# Patient Record
Sex: FEMALE | Race: ASIAN | NOT HISPANIC OR LATINO | Employment: UNEMPLOYED | URBAN - METROPOLITAN AREA
[De-identification: names, ages, dates, MRNs, and addresses within clinical notes are randomized per-mention and may not be internally consistent; named-entity substitution may affect disease eponyms.]

---

## 2019-05-01 ENCOUNTER — LAB VISIT (OUTPATIENT)
Dept: LAB | Facility: HOSPITAL | Age: 29
End: 2019-05-01
Attending: FAMILY MEDICINE
Payer: COMMERCIAL

## 2019-05-01 ENCOUNTER — OFFICE VISIT (OUTPATIENT)
Dept: FAMILY MEDICINE | Facility: CLINIC | Age: 29
End: 2019-05-01
Attending: FAMILY MEDICINE
Payer: COMMERCIAL

## 2019-05-01 VITALS
OXYGEN SATURATION: 100 % | HEART RATE: 102 BPM | BODY MASS INDEX: 20.11 KG/M2 | TEMPERATURE: 98 F | DIASTOLIC BLOOD PRESSURE: 72 MMHG | SYSTOLIC BLOOD PRESSURE: 108 MMHG | WEIGHT: 99.75 LBS | HEIGHT: 59 IN

## 2019-05-01 DIAGNOSIS — Z71.85 IMMUNIZATION COUNSELING: ICD-10-CM

## 2019-05-01 DIAGNOSIS — Z71.85 IMMUNIZATION COUNSELING: Primary | ICD-10-CM

## 2019-05-01 PROCEDURE — 36415 COLL VENOUS BLD VENIPUNCTURE: CPT | Mod: PO

## 2019-05-01 PROCEDURE — 99385 PREV VISIT NEW AGE 18-39: CPT | Mod: 25,S$GLB,, | Performed by: FAMILY MEDICINE

## 2019-05-01 PROCEDURE — 99999 PR PBB SHADOW E&M-NEW PATIENT-LVL III: CPT | Mod: PBBFAC,,, | Performed by: FAMILY MEDICINE

## 2019-05-01 PROCEDURE — 86706 HEP B SURFACE ANTIBODY: CPT

## 2019-05-01 PROCEDURE — 99999 PR PBB SHADOW E&M-NEW PATIENT-LVL III: ICD-10-PCS | Mod: PBBFAC,,, | Performed by: FAMILY MEDICINE

## 2019-05-01 PROCEDURE — 86765 RUBEOLA ANTIBODY: CPT

## 2019-05-01 PROCEDURE — 86762 RUBELLA ANTIBODY: CPT

## 2019-05-01 PROCEDURE — 99385 PR PREVENTIVE VISIT,NEW,18-39: ICD-10-PCS | Mod: 25,S$GLB,, | Performed by: FAMILY MEDICINE

## 2019-05-01 PROCEDURE — 86735 MUMPS ANTIBODY: CPT

## 2019-05-01 RX ORDER — NORETHINDRONE ACETATE AND ETHINYL ESTRADIOL AND FERROUS FUMARATE 1MG-20(21)
1 KIT ORAL DAILY
Refills: 3 | COMMUNITY
Start: 2019-04-01
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
## 2019-05-02 LAB
HBV SURFACE AB SER-ACNC: POSITIVE M[IU]/ML
RUBV IGG SER-ACNC: 50.3 IU/ML
RUBV IGG SER-IMP: REACTIVE

## 2019-05-03 LAB
MUMPS IGG INTERPRETATION: POSITIVE
MUMPS IGG SCREEN: 1.76 ISR (ref 0–0.9)
RUBEOLA IGG ANTIBODY: 1.94 ISR (ref 0–0.9)
RUBEOLA INTERPRETATION: POSITIVE

## 2019-05-08 ENCOUNTER — TELEPHONE (OUTPATIENT)
Dept: FAMILY MEDICINE | Facility: CLINIC | Age: 29
End: 2019-05-08

## 2019-05-08 NOTE — TELEPHONE ENCOUNTER
Spoke with pt, informed her that the form and labs are ready at the front for . Pt verbalized understanding.

## 2019-10-24 ENCOUNTER — TELEPHONE (OUTPATIENT)
Dept: FAMILY MEDICINE | Facility: CLINIC | Age: 29
End: 2019-10-24

## 2019-10-24 NOTE — TELEPHONE ENCOUNTER
Spoke with pt, she states that she needs a flu shot and TB skin test for school. Scheduled appointment for 11/5/19 at 4 pm.

## 2019-10-24 NOTE — TELEPHONE ENCOUNTER
----- Message from Josee Booth sent at 10/24/2019 12:36 PM CDT -----  Contact: Patient  Patient would like a flu shot and TB shot. Patient would like shot before 11/11/19.    [phone]

## 2019-11-05 ENCOUNTER — OFFICE VISIT (OUTPATIENT)
Dept: FAMILY MEDICINE | Facility: CLINIC | Age: 29
End: 2019-11-05
Attending: FAMILY MEDICINE
Payer: COMMERCIAL

## 2019-11-05 VITALS
HEIGHT: 59 IN | SYSTOLIC BLOOD PRESSURE: 112 MMHG | DIASTOLIC BLOOD PRESSURE: 74 MMHG | TEMPERATURE: 100 F | WEIGHT: 100.5 LBS | OXYGEN SATURATION: 99 % | BODY MASS INDEX: 20.26 KG/M2 | HEART RATE: 122 BPM

## 2019-11-05 DIAGNOSIS — Z00.00 WELLNESS EXAMINATION: Primary | ICD-10-CM

## 2019-11-05 PROCEDURE — 99999 PR PBB SHADOW E&M-EST. PATIENT-LVL III: CPT | Mod: PBBFAC,,, | Performed by: FAMILY MEDICINE

## 2019-11-05 PROCEDURE — 86580 TB INTRADERMAL TEST: CPT | Mod: S$GLB,,, | Performed by: FAMILY MEDICINE

## 2019-11-05 PROCEDURE — 90471 IMMUNIZATION ADMIN: CPT | Mod: S$GLB,,, | Performed by: FAMILY MEDICINE

## 2019-11-05 PROCEDURE — 90686 IIV4 VACC NO PRSV 0.5 ML IM: CPT | Mod: S$GLB,,, | Performed by: FAMILY MEDICINE

## 2019-11-05 PROCEDURE — 86580 PR  TB INTRADERMAL TEST: ICD-10-PCS | Mod: S$GLB,,, | Performed by: FAMILY MEDICINE

## 2019-11-05 PROCEDURE — 99395 PREV VISIT EST AGE 18-39: CPT | Mod: 25,S$GLB,, | Performed by: FAMILY MEDICINE

## 2019-11-05 PROCEDURE — 99395 PR PREVENTIVE VISIT,EST,18-39: ICD-10-PCS | Mod: 25,S$GLB,, | Performed by: FAMILY MEDICINE

## 2019-11-05 PROCEDURE — 90471 FLU VACCINE (QUAD) GREATER THAN OR EQUAL TO 3YO PRESERVATIVE FREE IM: ICD-10-PCS | Mod: S$GLB,,, | Performed by: FAMILY MEDICINE

## 2019-11-05 PROCEDURE — 90686 FLU VACCINE (QUAD) GREATER THAN OR EQUAL TO 3YO PRESERVATIVE FREE IM: ICD-10-PCS | Mod: S$GLB,,, | Performed by: FAMILY MEDICINE

## 2019-11-05 PROCEDURE — 99999 PR PBB SHADOW E&M-EST. PATIENT-LVL III: ICD-10-PCS | Mod: PBBFAC,,, | Performed by: FAMILY MEDICINE
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
## 2019-11-07 ENCOUNTER — CLINICAL SUPPORT (OUTPATIENT)
Dept: FAMILY MEDICINE | Facility: CLINIC | Age: 29
End: 2019-11-07
Payer: COMMERCIAL

## 2021-11-09 ENCOUNTER — PATIENT OUTREACH (OUTPATIENT)
Dept: ADMINISTRATIVE | Facility: HOSPITAL | Age: 31
End: 2021-11-09
Payer: COMMERCIAL

## 2022-05-23 ENCOUNTER — PATIENT OUTREACH (OUTPATIENT)
Dept: ADMINISTRATIVE | Facility: OTHER | Age: 32
End: 2022-05-23
Payer: COMMERCIAL

## 2022-06-01 ENCOUNTER — OFFICE VISIT (OUTPATIENT)
Dept: FAMILY MEDICINE | Facility: CLINIC | Age: 32
End: 2022-06-01
Attending: FAMILY MEDICINE
Payer: COMMERCIAL

## 2022-06-01 VITALS
TEMPERATURE: 98 F | BODY MASS INDEX: 22.18 KG/M2 | WEIGHT: 110 LBS | HEIGHT: 59 IN | HEART RATE: 104 BPM | OXYGEN SATURATION: 99 % | SYSTOLIC BLOOD PRESSURE: 120 MMHG | DIASTOLIC BLOOD PRESSURE: 80 MMHG

## 2022-06-01 DIAGNOSIS — Z00.00 ANNUAL PHYSICAL EXAM: Primary | ICD-10-CM

## 2022-06-01 PROCEDURE — 99395 PR PREVENTIVE VISIT,EST,18-39: ICD-10-PCS | Mod: 25,S$GLB,, | Performed by: NURSE PRACTITIONER

## 2022-06-01 PROCEDURE — 90715 TDAP VACCINE GREATER THAN OR EQUAL TO 7YO IM: ICD-10-PCS | Mod: S$GLB,,, | Performed by: NURSE PRACTITIONER

## 2022-06-01 PROCEDURE — 1159F MED LIST DOCD IN RCRD: CPT | Mod: CPTII,S$GLB,, | Performed by: NURSE PRACTITIONER

## 2022-06-01 PROCEDURE — 99395 PREV VISIT EST AGE 18-39: CPT | Mod: 25,S$GLB,, | Performed by: NURSE PRACTITIONER

## 2022-06-01 PROCEDURE — 3079F DIAST BP 80-89 MM HG: CPT | Mod: CPTII,S$GLB,, | Performed by: NURSE PRACTITIONER

## 2022-06-01 PROCEDURE — 3074F SYST BP LT 130 MM HG: CPT | Mod: CPTII,S$GLB,, | Performed by: NURSE PRACTITIONER

## 2022-06-01 PROCEDURE — 99999 PR PBB SHADOW E&M-EST. PATIENT-LVL III: ICD-10-PCS | Mod: PBBFAC,,, | Performed by: NURSE PRACTITIONER

## 2022-06-01 PROCEDURE — 90715 TDAP VACCINE 7 YRS/> IM: CPT | Mod: S$GLB,,, | Performed by: NURSE PRACTITIONER

## 2022-06-01 PROCEDURE — 3074F PR MOST RECENT SYSTOLIC BLOOD PRESSURE < 130 MM HG: ICD-10-PCS | Mod: CPTII,S$GLB,, | Performed by: NURSE PRACTITIONER

## 2022-06-01 PROCEDURE — 1159F PR MEDICATION LIST DOCUMENTED IN MEDICAL RECORD: ICD-10-PCS | Mod: CPTII,S$GLB,, | Performed by: NURSE PRACTITIONER

## 2022-06-01 PROCEDURE — 90471 IMMUNIZATION ADMIN: CPT | Mod: S$GLB,,, | Performed by: NURSE PRACTITIONER

## 2022-06-01 PROCEDURE — 3079F PR MOST RECENT DIASTOLIC BLOOD PRESSURE 80-89 MM HG: ICD-10-PCS | Mod: CPTII,S$GLB,, | Performed by: NURSE PRACTITIONER

## 2022-06-01 PROCEDURE — 3008F BODY MASS INDEX DOCD: CPT | Mod: CPTII,S$GLB,, | Performed by: NURSE PRACTITIONER

## 2022-06-01 PROCEDURE — 3008F PR BODY MASS INDEX (BMI) DOCUMENTED: ICD-10-PCS | Mod: CPTII,S$GLB,, | Performed by: NURSE PRACTITIONER

## 2022-06-01 PROCEDURE — 90471 TDAP VACCINE GREATER THAN OR EQUAL TO 7YO IM: ICD-10-PCS | Mod: S$GLB,,, | Performed by: NURSE PRACTITIONER

## 2022-06-01 PROCEDURE — 99999 PR PBB SHADOW E&M-EST. PATIENT-LVL III: CPT | Mod: PBBFAC,,, | Performed by: NURSE PRACTITIONER

## 2022-06-01 RX ORDER — SERTRALINE HYDROCHLORIDE 50 MG/1
50 TABLET, FILM COATED ORAL DAILY
COMMUNITY
Start: 2022-05-13 | End: 2023-02-28

## 2022-06-01 NOTE — PROGRESS NOTES
Subjective:       Patient ID: Camelia Dumont is a 32 y.o. female.    Chief Complaint: Annual Exam  Pt reports to clinic for annual physical exam.  Non smoker, ETOH use 2-3 drinks on weekends.  Preventive screenings current.  FMH; arthritis and hyperlipidemia.  No recent ER visits or hospitalizations.  No other complaints at this time      HPI  Review of Systems   Constitutional: Negative for appetite change, chills, fatigue, fever and unexpected weight change.   HENT: Negative for congestion, ear pain, sinus pressure, sneezing and sore throat.    Eyes: Negative.    Respiratory: Negative for cough, chest tightness, shortness of breath and wheezing.    Cardiovascular: Negative for chest pain and palpitations.   Gastrointestinal: Negative for abdominal pain, constipation, diarrhea, nausea and vomiting.   Endocrine: Negative.    Genitourinary: Negative for difficulty urinating, dysuria, flank pain, frequency, hematuria, pelvic pain, urgency and vaginal discharge.   Musculoskeletal: Negative.    Skin: Negative.    Allergic/Immunologic: Negative.    Neurological: Negative for dizziness and headaches.   Hematological: Negative.    Psychiatric/Behavioral: Negative for behavioral problems, confusion, hallucinations and suicidal ideas. The patient is not nervous/anxious.        Objective:      Physical Exam  Vitals reviewed.   Constitutional:       Appearance: She is well-developed.   HENT:      Head: Normocephalic.      Mouth/Throat:      Pharynx: No oropharyngeal exudate.   Eyes:      Pupils: Pupils are equal, round, and reactive to light.   Neck:      Vascular: No JVD.      Trachea: No tracheal deviation.   Cardiovascular:      Rate and Rhythm: Normal rate and regular rhythm.      Heart sounds: Normal heart sounds. No murmur heard.  Pulmonary:      Effort: Pulmonary effort is normal. No respiratory distress.      Breath sounds: Normal breath sounds.   Abdominal:      General: Bowel sounds are normal. There is no  distension.      Palpations: Abdomen is soft.      Tenderness: There is no abdominal tenderness.   Musculoskeletal:         General: Normal range of motion.      Cervical back: Normal range of motion.   Skin:     General: Skin is warm and dry.   Neurological:      Mental Status: She is alert and oriented to person, place, and time.      Deep Tendon Reflexes: Reflexes are normal and symmetric.   Psychiatric:         Speech: Speech normal.         Behavior: Behavior normal.         Assessment:       1. Annual physical exam        Plan:   Annual physical exam  -     CBC Auto Differential; Future; Expected date: 06/01/2022  -     Comprehensive Metabolic Panel; Future; Expected date: 06/01/2022  -     Lipid Panel; Future; Expected date: 06/01/2022  -     Hepatitis C Antibody; Future; Expected date: 06/01/2022  -     URINALYSIS  -     HIV 1/2 Ag/Ab (4th Gen); Future; Expected date: 06/01/2022  -     (In Office Administered) Tdap Vaccine      No follow-ups on file.

## 2022-07-06 ENCOUNTER — LAB VISIT (OUTPATIENT)
Dept: LAB | Facility: HOSPITAL | Age: 32
End: 2022-07-06
Attending: NURSE PRACTITIONER
Payer: COMMERCIAL

## 2022-07-06 DIAGNOSIS — Z00.00 ANNUAL PHYSICAL EXAM: ICD-10-CM

## 2022-07-06 LAB
ALBUMIN SERPL BCP-MCNC: 3.6 G/DL (ref 3.5–5.2)
ALP SERPL-CCNC: 70 U/L (ref 55–135)
ALT SERPL W/O P-5'-P-CCNC: 12 U/L (ref 10–44)
ANION GAP SERPL CALC-SCNC: 9 MMOL/L (ref 8–16)
AST SERPL-CCNC: 17 U/L (ref 10–40)
BASOPHILS # BLD AUTO: 0 K/UL (ref 0–0.2)
BASOPHILS NFR BLD: 0 % (ref 0–1.9)
BILIRUB SERPL-MCNC: 0.3 MG/DL (ref 0.1–1)
BUN SERPL-MCNC: 10 MG/DL (ref 6–20)
CALCIUM SERPL-MCNC: 9 MG/DL (ref 8.7–10.5)
CHLORIDE SERPL-SCNC: 107 MMOL/L (ref 95–110)
CHOLEST SERPL-MCNC: 173 MG/DL (ref 120–199)
CHOLEST/HDLC SERPL: 3.3 {RATIO} (ref 2–5)
CO2 SERPL-SCNC: 22 MMOL/L (ref 23–29)
CREAT SERPL-MCNC: 0.8 MG/DL (ref 0.5–1.4)
DIFFERENTIAL METHOD: ABNORMAL
EOSINOPHIL # BLD AUTO: 0.1 K/UL (ref 0–0.5)
EOSINOPHIL NFR BLD: 1.4 % (ref 0–8)
ERYTHROCYTE [DISTWIDTH] IN BLOOD BY AUTOMATED COUNT: 12.4 % (ref 11.5–14.5)
EST. GFR  (AFRICAN AMERICAN): >60 ML/MIN/1.73 M^2
EST. GFR  (NON AFRICAN AMERICAN): >60 ML/MIN/1.73 M^2
GLUCOSE SERPL-MCNC: 78 MG/DL (ref 70–110)
HCT VFR BLD AUTO: 38.2 % (ref 37–48.5)
HDLC SERPL-MCNC: 52 MG/DL (ref 40–75)
HDLC SERPL: 30.1 % (ref 20–50)
HGB BLD-MCNC: 11.6 G/DL (ref 12–16)
IMM GRANULOCYTES # BLD AUTO: 0.02 K/UL (ref 0–0.04)
IMM GRANULOCYTES NFR BLD AUTO: 0.4 % (ref 0–0.5)
LDLC SERPL CALC-MCNC: 95 MG/DL (ref 63–159)
LYMPHOCYTES # BLD AUTO: 1.2 K/UL (ref 1–4.8)
LYMPHOCYTES NFR BLD: 24 % (ref 18–48)
MCH RBC QN AUTO: 27.8 PG (ref 27–31)
MCHC RBC AUTO-ENTMCNC: 30.4 G/DL (ref 32–36)
MCV RBC AUTO: 91 FL (ref 82–98)
MONOCYTES # BLD AUTO: 0.4 K/UL (ref 0.3–1)
MONOCYTES NFR BLD: 8.5 % (ref 4–15)
NEUTROPHILS # BLD AUTO: 3.3 K/UL (ref 1.8–7.7)
NEUTROPHILS NFR BLD: 65.7 % (ref 38–73)
NONHDLC SERPL-MCNC: 121 MG/DL
NRBC BLD-RTO: 0 /100 WBC
PLATELET # BLD AUTO: 232 K/UL (ref 150–450)
PMV BLD AUTO: 11 FL (ref 9.2–12.9)
POTASSIUM SERPL-SCNC: 3.8 MMOL/L (ref 3.5–5.1)
PROT SERPL-MCNC: 7.9 G/DL (ref 6–8.4)
RBC # BLD AUTO: 4.18 M/UL (ref 4–5.4)
SODIUM SERPL-SCNC: 138 MMOL/L (ref 136–145)
TRIGL SERPL-MCNC: 130 MG/DL (ref 30–150)
WBC # BLD AUTO: 5.08 K/UL (ref 3.9–12.7)

## 2022-07-06 PROCEDURE — 36415 COLL VENOUS BLD VENIPUNCTURE: CPT | Mod: PO | Performed by: NURSE PRACTITIONER

## 2022-07-06 PROCEDURE — 85025 COMPLETE CBC W/AUTO DIFF WBC: CPT | Performed by: NURSE PRACTITIONER

## 2022-07-06 PROCEDURE — 80053 COMPREHEN METABOLIC PANEL: CPT | Performed by: NURSE PRACTITIONER

## 2022-07-06 PROCEDURE — 86803 HEPATITIS C AB TEST: CPT | Performed by: NURSE PRACTITIONER

## 2022-07-06 PROCEDURE — 87389 HIV-1 AG W/HIV-1&-2 AB AG IA: CPT | Performed by: NURSE PRACTITIONER

## 2022-07-06 PROCEDURE — 80061 LIPID PANEL: CPT | Performed by: NURSE PRACTITIONER

## 2022-07-07 LAB
HCV AB SERPL QL IA: NEGATIVE
HIV 1+2 AB+HIV1 P24 AG SERPL QL IA: NEGATIVE

## 2022-09-21 ENCOUNTER — OFFICE VISIT (OUTPATIENT)
Dept: FAMILY MEDICINE | Facility: CLINIC | Age: 32
End: 2022-09-21
Payer: COMMERCIAL

## 2022-09-21 VITALS
WEIGHT: 111.56 LBS | HEIGHT: 59 IN | TEMPERATURE: 98 F | DIASTOLIC BLOOD PRESSURE: 70 MMHG | BODY MASS INDEX: 22.49 KG/M2 | OXYGEN SATURATION: 98 % | HEART RATE: 105 BPM | SYSTOLIC BLOOD PRESSURE: 112 MMHG

## 2022-09-21 DIAGNOSIS — M79.18 MUSCULOSKELETAL PAIN: Primary | ICD-10-CM

## 2022-09-21 PROCEDURE — 3078F PR MOST RECENT DIASTOLIC BLOOD PRESSURE < 80 MM HG: ICD-10-PCS | Mod: CPTII,S$GLB,, | Performed by: NURSE PRACTITIONER

## 2022-09-21 PROCEDURE — 99214 OFFICE O/P EST MOD 30 MIN: CPT | Mod: S$GLB,,, | Performed by: NURSE PRACTITIONER

## 2022-09-21 PROCEDURE — 3008F PR BODY MASS INDEX (BMI) DOCUMENTED: ICD-10-PCS | Mod: CPTII,S$GLB,, | Performed by: NURSE PRACTITIONER

## 2022-09-21 PROCEDURE — 1159F PR MEDICATION LIST DOCUMENTED IN MEDICAL RECORD: ICD-10-PCS | Mod: CPTII,S$GLB,, | Performed by: NURSE PRACTITIONER

## 2022-09-21 PROCEDURE — 1160F PR REVIEW ALL MEDS BY PRESCRIBER/CLIN PHARMACIST DOCUMENTED: ICD-10-PCS | Mod: CPTII,S$GLB,, | Performed by: NURSE PRACTITIONER

## 2022-09-21 PROCEDURE — 1160F RVW MEDS BY RX/DR IN RCRD: CPT | Mod: CPTII,S$GLB,, | Performed by: NURSE PRACTITIONER

## 2022-09-21 PROCEDURE — 3074F PR MOST RECENT SYSTOLIC BLOOD PRESSURE < 130 MM HG: ICD-10-PCS | Mod: CPTII,S$GLB,, | Performed by: NURSE PRACTITIONER

## 2022-09-21 PROCEDURE — 3008F BODY MASS INDEX DOCD: CPT | Mod: CPTII,S$GLB,, | Performed by: NURSE PRACTITIONER

## 2022-09-21 PROCEDURE — 1159F MED LIST DOCD IN RCRD: CPT | Mod: CPTII,S$GLB,, | Performed by: NURSE PRACTITIONER

## 2022-09-21 PROCEDURE — 99999 PR PBB SHADOW E&M-EST. PATIENT-LVL III: ICD-10-PCS | Mod: PBBFAC,,, | Performed by: NURSE PRACTITIONER

## 2022-09-21 PROCEDURE — 3078F DIAST BP <80 MM HG: CPT | Mod: CPTII,S$GLB,, | Performed by: NURSE PRACTITIONER

## 2022-09-21 PROCEDURE — 3074F SYST BP LT 130 MM HG: CPT | Mod: CPTII,S$GLB,, | Performed by: NURSE PRACTITIONER

## 2022-09-21 PROCEDURE — 99999 PR PBB SHADOW E&M-EST. PATIENT-LVL III: CPT | Mod: PBBFAC,,, | Performed by: NURSE PRACTITIONER

## 2022-09-21 PROCEDURE — 99214 PR OFFICE/OUTPT VISIT, EST, LEVL IV, 30-39 MIN: ICD-10-PCS | Mod: S$GLB,,, | Performed by: NURSE PRACTITIONER

## 2022-09-21 RX ORDER — DICLOFENAC SODIUM 50 MG/1
50 TABLET, DELAYED RELEASE ORAL 2 TIMES DAILY
Qty: 20 TABLET | Refills: 0 | Status: SHIPPED | OUTPATIENT
Start: 2022-09-21 | End: 2022-10-01

## 2022-09-21 RX ORDER — METHYLPREDNISOLONE 4 MG/1
TABLET ORAL
Qty: 21 EACH | Refills: 0 | Status: SHIPPED | OUTPATIENT
Start: 2022-09-21 | End: 2022-10-12

## 2022-09-26 NOTE — PROGRESS NOTES
Subjective:       Patient ID: Camelia Dumont is a 32 y.o. female.    Chief Complaint: Joint Pain  pT reports to clinic with chief complaint of multiple joint pain for 2-3 weeks.  Noticed pain began in left shoulder.  Does not recall any remote injury or trauma.  Pain radiated to bicep area.  Would awaken from sleep.  Heat and ibuprofen afforded relief.  Recently pain began in right arm and left wrist.  No paresthesia.  No swelling.  Worsens with stretching.    History reviewed. No pertinent past medical history.   Joint Pain  This is a new problem. The problem occurs constantly. The problem has been unchanged. Associated symptoms include arthralgias, myalgias, numbness and weakness. Pertinent negatives include no abdominal pain, chest pain, fever or headaches. Nothing aggravates the symptoms. She has tried NSAIDs and heat for the symptoms.   Back Pain  This is a recurrent problem. The current episode started 1 to 4 weeks ago. The problem occurs intermittently. The problem has been waxing and waning since onset. The pain is present in the thoracic spine. The quality of the pain is described as aching. The pain does not radiate. The pain is at a severity of 5/10. The pain is moderate. The pain is The same all the time. The symptoms are aggravated by bending, position and twisting. Stiffness is present In the morning. Associated symptoms include numbness and weakness. Pertinent negatives include no abdominal pain, bladder incontinence, bowel incontinence, chest pain, dysuria, fever, headaches, leg pain, paresis, paresthesias, pelvic pain, perianal numbness, tingling or weight loss. The treatment provided mild relief.   Review of Systems   Constitutional:  Negative for fever and weight loss.   HENT: Negative.     Respiratory: Negative.     Cardiovascular:  Negative for chest pain.   Gastrointestinal:  Negative for abdominal pain and bowel incontinence.   Genitourinary:  Negative for bladder incontinence, dysuria,  hematuria and pelvic pain.   Musculoskeletal:  Positive for arthralgias, back pain and myalgias.   Neurological:  Positive for weakness and numbness. Negative for tingling, headaches and paresthesias.     Objective:      Physical Exam  Vitals reviewed.   HENT:      Head: Normocephalic.      Right Ear: External ear normal.      Left Ear: External ear normal.   Eyes:      Extraocular Movements: Extraocular movements intact.   Cardiovascular:      Rate and Rhythm: Normal rate.   Pulmonary:      Effort: Pulmonary effort is normal.   Musculoskeletal:      Right shoulder: Tenderness present. No swelling. Normal range of motion.      Left shoulder: Tenderness present. No swelling or deformity. Normal range of motion.      Cervical back: Normal range of motion.   Skin:     General: Skin is warm.   Neurological:      General: No focal deficit present.      Mental Status: She is alert and oriented to person, place, and time.   Psychiatric:         Behavior: Behavior normal.       Assessment:       1. Musculoskeletal pain          Plan:   Musculoskeletal pain  -     diclofenac (VOLTAREN) 50 MG EC tablet; Take 1 tablet (50 mg total) by mouth 2 (two) times daily. for 10 days  Dispense: 20 tablet; Refill: 0  -     methylPREDNISolone (MEDROL DOSEPACK) 4 mg tablet; use as directed  Dispense: 21 each; Refill: 0  -if no improvement will obtain labs EDI, RF, ESR, CRP    No follow-ups on file.      Answers submitted by the patient for this visit:  Back Pain Questionnaire (Submitted on 9/21/2022)  Chief Complaint: Back pain  genital pain: No

## 2022-09-28 ENCOUNTER — PATIENT MESSAGE (OUTPATIENT)
Dept: FAMILY MEDICINE | Facility: CLINIC | Age: 32
End: 2022-09-28
Payer: COMMERCIAL

## 2022-10-10 ENCOUNTER — PATIENT MESSAGE (OUTPATIENT)
Dept: FAMILY MEDICINE | Facility: CLINIC | Age: 32
End: 2022-10-10
Payer: COMMERCIAL

## 2022-10-10 DIAGNOSIS — M79.10 MYALGIA: Primary | ICD-10-CM

## 2022-10-13 ENCOUNTER — LAB VISIT (OUTPATIENT)
Dept: LAB | Facility: HOSPITAL | Age: 32
End: 2022-10-13
Attending: NURSE PRACTITIONER
Payer: COMMERCIAL

## 2022-10-13 DIAGNOSIS — M79.10 MYALGIA: ICD-10-CM

## 2022-10-13 LAB — RHEUMATOID FACT SERPL-ACNC: 22 IU/ML (ref 0–15)

## 2022-10-13 PROCEDURE — 86235 NUCLEAR ANTIGEN ANTIBODY: CPT | Mod: 59 | Performed by: NURSE PRACTITIONER

## 2022-10-13 PROCEDURE — 36415 COLL VENOUS BLD VENIPUNCTURE: CPT | Mod: PO | Performed by: NURSE PRACTITIONER

## 2022-10-13 PROCEDURE — 85652 RBC SED RATE AUTOMATED: CPT | Performed by: NURSE PRACTITIONER

## 2022-10-13 PROCEDURE — 84550 ASSAY OF BLOOD/URIC ACID: CPT | Performed by: NURSE PRACTITIONER

## 2022-10-13 PROCEDURE — 86038 ANTINUCLEAR ANTIBODIES: CPT | Performed by: NURSE PRACTITIONER

## 2022-10-13 PROCEDURE — 86431 RHEUMATOID FACTOR QUANT: CPT | Performed by: NURSE PRACTITIONER

## 2022-10-13 PROCEDURE — 86039 ANTINUCLEAR ANTIBODIES (ANA): CPT | Performed by: NURSE PRACTITIONER

## 2022-10-13 PROCEDURE — 86140 C-REACTIVE PROTEIN: CPT | Performed by: NURSE PRACTITIONER

## 2022-10-13 PROCEDURE — 85025 COMPLETE CBC W/AUTO DIFF WBC: CPT | Performed by: NURSE PRACTITIONER

## 2022-10-14 DIAGNOSIS — R76.8 RHEUMATOID FACTOR POSITIVE: Primary | ICD-10-CM

## 2022-10-14 LAB
ANA PATTERN 1: NORMAL
ANA SER QL IF: POSITIVE
ANA TITR SER IF: NORMAL {TITER}
BASOPHILS # BLD AUTO: 0.01 K/UL (ref 0–0.2)
BASOPHILS NFR BLD: 0.2 % (ref 0–1.9)
CRP SERPL-MCNC: 14.6 MG/L (ref 0–8.2)
DIFFERENTIAL METHOD: ABNORMAL
EOSINOPHIL # BLD AUTO: 0.1 K/UL (ref 0–0.5)
EOSINOPHIL NFR BLD: 1.1 % (ref 0–8)
ERYTHROCYTE [DISTWIDTH] IN BLOOD BY AUTOMATED COUNT: 12.3 % (ref 11.5–14.5)
ERYTHROCYTE [SEDIMENTATION RATE] IN BLOOD BY PHOTOMETRIC METHOD: 44 MM/HR (ref 0–36)
HCT VFR BLD AUTO: 37.3 % (ref 37–48.5)
HGB BLD-MCNC: 11.4 G/DL (ref 12–16)
IMM GRANULOCYTES # BLD AUTO: 0.01 K/UL (ref 0–0.04)
IMM GRANULOCYTES NFR BLD AUTO: 0.2 % (ref 0–0.5)
LYMPHOCYTES # BLD AUTO: 1.6 K/UL (ref 1–4.8)
LYMPHOCYTES NFR BLD: 35.7 % (ref 18–48)
MCH RBC QN AUTO: 27.6 PG (ref 27–31)
MCHC RBC AUTO-ENTMCNC: 30.6 G/DL (ref 32–36)
MCV RBC AUTO: 90 FL (ref 82–98)
MONOCYTES # BLD AUTO: 0.5 K/UL (ref 0.3–1)
MONOCYTES NFR BLD: 11.2 % (ref 4–15)
NEUTROPHILS # BLD AUTO: 2.3 K/UL (ref 1.8–7.7)
NEUTROPHILS NFR BLD: 51.6 % (ref 38–73)
NRBC BLD-RTO: 0 /100 WBC
PLATELET # BLD AUTO: 260 K/UL (ref 150–450)
PMV BLD AUTO: 11.1 FL (ref 9.2–12.9)
RBC # BLD AUTO: 4.13 M/UL (ref 4–5.4)
URATE SERPL-MCNC: 2.9 MG/DL (ref 2.4–5.7)
WBC # BLD AUTO: 4.54 K/UL (ref 3.9–12.7)

## 2022-10-18 LAB
ANTI SM ANTIBODY: 0.1 RATIO (ref 0–0.99)
ANTI SM/RNP ANTIBODY: 0.2 RATIO (ref 0–0.99)
ANTI-SM INTERPRETATION: NEGATIVE
ANTI-SM/RNP INTERPRETATION: NEGATIVE
ANTI-SSA ANTIBODY: 5.9 RATIO (ref 0–0.99)
ANTI-SSA INTERPRETATION: POSITIVE
ANTI-SSB ANTIBODY: 0.81 RATIO (ref 0–0.99)
ANTI-SSB INTERPRETATION: NEGATIVE
DSDNA AB SER-ACNC: ABNORMAL [IU]/ML

## 2022-10-28 ENCOUNTER — PATIENT OUTREACH (OUTPATIENT)
Dept: ADMINISTRATIVE | Facility: HOSPITAL | Age: 32
End: 2022-10-28
Payer: COMMERCIAL

## 2022-11-09 ENCOUNTER — PATIENT OUTREACH (OUTPATIENT)
Dept: ADMINISTRATIVE | Facility: HOSPITAL | Age: 32
End: 2022-11-09
Payer: COMMERCIAL

## 2022-11-10 ENCOUNTER — HOSPITAL ENCOUNTER (OUTPATIENT)
Dept: RADIOLOGY | Facility: HOSPITAL | Age: 32
Discharge: HOME | End: 2022-11-10
Attending: STUDENT IN AN ORGANIZED HEALTH CARE EDUCATION/TRAINING PROGRAM
Payer: COMMERCIAL

## 2022-11-10 ENCOUNTER — OFFICE VISIT (OUTPATIENT)
Dept: RHEUMATOLOGY | Facility: CLINIC | Age: 32
End: 2022-11-10
Payer: COMMERCIAL

## 2022-11-10 ENCOUNTER — PATIENT OUTREACH (OUTPATIENT)
Dept: ADMINISTRATIVE | Facility: HOSPITAL | Age: 32
End: 2022-11-10
Payer: COMMERCIAL

## 2022-11-10 VITALS
BODY MASS INDEX: 23.24 KG/M2 | WEIGHT: 115.31 LBS | RESPIRATION RATE: 16 BRPM | DIASTOLIC BLOOD PRESSURE: 72 MMHG | HEIGHT: 59 IN | SYSTOLIC BLOOD PRESSURE: 108 MMHG | HEART RATE: 86 BPM

## 2022-11-10 DIAGNOSIS — M25.50 POLYARTHRALGIA: ICD-10-CM

## 2022-11-10 DIAGNOSIS — R53.83 FATIGUE, UNSPECIFIED TYPE: ICD-10-CM

## 2022-11-10 DIAGNOSIS — R76.8 RHEUMATOID FACTOR POSITIVE: ICD-10-CM

## 2022-11-10 DIAGNOSIS — R76.8 ANA POSITIVE: ICD-10-CM

## 2022-11-10 DIAGNOSIS — Z79.899 LONG-TERM USE OF PLAQUENIL: ICD-10-CM

## 2022-11-10 DIAGNOSIS — R76.8 RHEUMATOID FACTOR POSITIVE: Primary | ICD-10-CM

## 2022-11-10 PROCEDURE — 1159F PR MEDICATION LIST DOCUMENTED IN MEDICAL RECORD: ICD-10-PCS | Mod: CPTII,S$GLB,, | Performed by: STUDENT IN AN ORGANIZED HEALTH CARE EDUCATION/TRAINING PROGRAM

## 2022-11-10 PROCEDURE — 3008F BODY MASS INDEX DOCD: CPT | Mod: CPTII,S$GLB,, | Performed by: STUDENT IN AN ORGANIZED HEALTH CARE EDUCATION/TRAINING PROGRAM

## 2022-11-10 PROCEDURE — 1160F PR REVIEW ALL MEDS BY PRESCRIBER/CLIN PHARMACIST DOCUMENTED: ICD-10-PCS | Mod: CPTII,S$GLB,, | Performed by: STUDENT IN AN ORGANIZED HEALTH CARE EDUCATION/TRAINING PROGRAM

## 2022-11-10 PROCEDURE — 3074F PR MOST RECENT SYSTOLIC BLOOD PRESSURE < 130 MM HG: ICD-10-PCS | Mod: CPTII,S$GLB,, | Performed by: STUDENT IN AN ORGANIZED HEALTH CARE EDUCATION/TRAINING PROGRAM

## 2022-11-10 PROCEDURE — 1159F MED LIST DOCD IN RCRD: CPT | Mod: CPTII,S$GLB,, | Performed by: STUDENT IN AN ORGANIZED HEALTH CARE EDUCATION/TRAINING PROGRAM

## 2022-11-10 PROCEDURE — 71046 X-RAY EXAM CHEST 2 VIEWS: CPT | Mod: TC

## 2022-11-10 PROCEDURE — 3078F PR MOST RECENT DIASTOLIC BLOOD PRESSURE < 80 MM HG: ICD-10-PCS | Mod: CPTII,S$GLB,, | Performed by: STUDENT IN AN ORGANIZED HEALTH CARE EDUCATION/TRAINING PROGRAM

## 2022-11-10 PROCEDURE — 71046 XR CHEST PA AND LATERAL: ICD-10-PCS | Mod: 26,,, | Performed by: RADIOLOGY

## 2022-11-10 PROCEDURE — 77077 XR ARTHRITIS SURVEY: ICD-10-PCS | Mod: 26,,, | Performed by: RADIOLOGY

## 2022-11-10 PROCEDURE — 99204 PR OFFICE/OUTPT VISIT, NEW, LEVL IV, 45-59 MIN: ICD-10-PCS | Mod: S$GLB,,, | Performed by: STUDENT IN AN ORGANIZED HEALTH CARE EDUCATION/TRAINING PROGRAM

## 2022-11-10 PROCEDURE — 3008F PR BODY MASS INDEX (BMI) DOCUMENTED: ICD-10-PCS | Mod: CPTII,S$GLB,, | Performed by: STUDENT IN AN ORGANIZED HEALTH CARE EDUCATION/TRAINING PROGRAM

## 2022-11-10 PROCEDURE — 99999 PR PBB SHADOW E&M-EST. PATIENT-LVL V: ICD-10-PCS | Mod: PBBFAC,,, | Performed by: STUDENT IN AN ORGANIZED HEALTH CARE EDUCATION/TRAINING PROGRAM

## 2022-11-10 PROCEDURE — 99204 OFFICE O/P NEW MOD 45 MIN: CPT | Mod: S$GLB,,, | Performed by: STUDENT IN AN ORGANIZED HEALTH CARE EDUCATION/TRAINING PROGRAM

## 2022-11-10 PROCEDURE — 3078F DIAST BP <80 MM HG: CPT | Mod: CPTII,S$GLB,, | Performed by: STUDENT IN AN ORGANIZED HEALTH CARE EDUCATION/TRAINING PROGRAM

## 2022-11-10 PROCEDURE — 77077 JOINT SURVEY SINGLE VIEW: CPT | Mod: TC

## 2022-11-10 PROCEDURE — 73130 XR HAND COMPLETE 3 VIEWS BILATERAL: ICD-10-PCS | Mod: 26,,, | Performed by: RADIOLOGY

## 2022-11-10 PROCEDURE — 77077 JOINT SURVEY SINGLE VIEW: CPT | Mod: 26,,, | Performed by: RADIOLOGY

## 2022-11-10 PROCEDURE — 73130 X-RAY EXAM OF HAND: CPT | Mod: TC,50

## 2022-11-10 PROCEDURE — 3074F SYST BP LT 130 MM HG: CPT | Mod: CPTII,S$GLB,, | Performed by: STUDENT IN AN ORGANIZED HEALTH CARE EDUCATION/TRAINING PROGRAM

## 2022-11-10 PROCEDURE — 71046 X-RAY EXAM CHEST 2 VIEWS: CPT | Mod: 26,,, | Performed by: RADIOLOGY

## 2022-11-10 PROCEDURE — 73130 X-RAY EXAM OF HAND: CPT | Mod: 26,,, | Performed by: RADIOLOGY

## 2022-11-10 PROCEDURE — 99999 PR PBB SHADOW E&M-EST. PATIENT-LVL V: CPT | Mod: PBBFAC,,, | Performed by: STUDENT IN AN ORGANIZED HEALTH CARE EDUCATION/TRAINING PROGRAM

## 2022-11-10 PROCEDURE — 1160F RVW MEDS BY RX/DR IN RCRD: CPT | Mod: CPTII,S$GLB,, | Performed by: STUDENT IN AN ORGANIZED HEALTH CARE EDUCATION/TRAINING PROGRAM

## 2022-11-10 RX ORDER — NAPROXEN 500 MG/1
500 TABLET ORAL EVERY 12 HOURS PRN
COMMUNITY
Start: 2022-11-02 | End: 2022-12-01 | Stop reason: ALTCHOICE

## 2022-11-10 RX ORDER — DICLOFENAC SODIUM 10 MG/G
2 GEL TOPICAL 4 TIMES DAILY
Qty: 100 G | Refills: 3 | Status: SHIPPED | OUTPATIENT
Start: 2022-11-10 | End: 2023-10-10

## 2022-11-10 NOTE — PROGRESS NOTES
Health Maintenance Due   Topic Date Due    COVID-19 Vaccine (3 - Booster for Pfizer series) 03/29/2021

## 2022-11-10 NOTE — PROGRESS NOTES
RHEUMATOLOGY CLINIC INITIAL VISIT    Reason for consult:- possible rheumatoid arthritis    Chief complaints, HPI, ROS, EXAM, Assessment & Plans:-    Camelia Dumont is a 32 y.o. pleasant female who presents to be evaluated for possible rheumatoid arthritis.  Patient states around labor day weekend she began to notice a lot of soreness and joint pain.  This was primarily in her wrist and hand as well as some pain in her ankle in her neck.  She states she is still having most of the pain in her hand and feels like it is swollen at times.  She has a good degree of morning stiffness but is not sure that she gets much less stiff throughout the day.  She also reports left shoulder pain that radiates down her arm.  She is been taking ibuprofen every 6 hours which does help.  She was recently switched last week to naproxen b.i.d. which is helping a lot she states.  She was found to have positive rheumatoid factor as well as positive EDI.  She also reports fatigue, weight gain and some shortness of breath.  She is had 2 successful pregnancies but 1 miscarriage at 6 weeks.  She denies any family history of any autoimmune conditions that she is aware of.The patient denies fevers, patchy alopecia, oral/nasal ulcers, sicca symptoms, rashes, photosensitivity, pleuritic chest pain,  cough, abdominal pain, diarrhea, bloody stool, weakness, numbness/tingling, and Raynaud's.  She does have a couple of joints that are tender on her hands but no obvious synovitis.  No dactylitis or enthesitis.  No rashes noted.  No patchy alopecia.  No oral ulcers.         Reviewed all available old and outside pertinent medical records.    All lab results personally reviewed and interpreted by me.    1. Rheumatoid factor positive    2. EDI positive    3. Fatigue, unspecified type    4. Polyarthralgia        Problem List Items Addressed This Visit    None  Visit Diagnoses       Rheumatoid factor positive    -  Primary    Relevant Orders    Beta-2  Glycoprotein Abs (IgA, IgG, IgM)    Cardiolipin antibody    Immunofixation Electrophoresis    DRVVT    EDI positive        Relevant Orders    Beta-2 Glycoprotein Abs (IgA, IgG, IgM)    Cardiolipin antibody    Immunofixation Electrophoresis    DRVVT    Fatigue, unspecified type        Relevant Orders    Beta-2 Glycoprotein Abs (IgA, IgG, IgM)    Cardiolipin antibody    Immunofixation Electrophoresis    DRVVT    Polyarthralgia        Relevant Medications    diclofenac sodium (VOLTAREN ARTHRITIS PAIN) 1 % Gel    Other Relevant Orders    Beta-2 Glycoprotein Abs (IgA, IgG, IgM)    Cardiolipin antibody    Immunofixation Electrophoresis    DRVVT            Patient presenting to be evaluated for possible rheumatoid arthritis  Was found to have elevated rheumatoid factor (22)  Also with positive EDI 1:1280 speckled pattern with positive SSA but otherwise negative profile  ESR 44 and CRP 14.6  Some tenderness to joint palpation of hands but no obvious synovitis  Checked CCP which was positive at 65.2  Repeat inflammatory markers elevated with ESR of 77 and CRP of 14.1  X-ray arthritis survey and chest x-ray were normal  We will plan to start on hydroxychloroquine 200 mg daily for treatment of mild rheumatoid arthritis  Recommend baseline eye exam will place referral to Ophthalmology    # Follow up in about 4 months (around 3/10/2023).    Chronic comorbid conditions affecting medical decision making today:    History reviewed. No pertinent past medical history.    History reviewed. No pertinent surgical history.     Social History     Tobacco Use    Smoking status: Former     Years: 1.00     Types: Cigarettes     Quit date: 2008     Years since quittin.8    Smokeless tobacco: Never   Substance Use Topics    Alcohol use: Yes    Drug use: Never       Family History   Problem Relation Age of Onset    No Known Problems Mother     Hyperlipidemia Father        Review of patient's allergies indicates:  No Known  Allergies    Medication List with Changes/Refills   New Medications    DICLOFENAC SODIUM (VOLTAREN ARTHRITIS PAIN) 1 % GEL    Apply 2 g topically 4 (four) times daily.   Current Medications    JUNEL FE 1/20, 28, 1 MG-20 MCG (21)/75 MG (7) PER TABLET    Take 1 tablet by mouth once daily.    NAPROXEN (EC NAPROSYN) 500 MG EC TABLET    Take 500 mg by mouth every 12 (twelve) hours as needed.    SERTRALINE (ZOLOFT) 50 MG TABLET    Take 50 mg by mouth once daily.   Discontinued Medications    DICLOFENAC (VOLTAREN) 50 MG EC TABLET    Take 1 tablet (50 mg total) by mouth 2 (two) times daily. for 10 days         Disclaimer: This note was prepared using voice recognition system and is likely to have sound alike errors and is not proofread.  Please message me with any questions.    45 minutes of total time spent on the encounter, which includes face to face time and non-face to face time preparing to see the patient (eg, review of tests), Obtaining and/or reviewing separately obtained history, Documenting clinical information in the electronic or other health record, Independently interpreting results (not separately reported) and communicating results to the patient/family/caregiver, or Care coordination (not separately reported).     Thank you for allowing me to participate in the care of Camelia Dumont.    Travis Quick MD

## 2022-11-11 ENCOUNTER — PATIENT MESSAGE (OUTPATIENT)
Dept: RHEUMATOLOGY | Facility: CLINIC | Age: 32
End: 2022-11-11
Payer: COMMERCIAL

## 2022-11-11 RX ORDER — HYDROXYCHLOROQUINE SULFATE 200 MG/1
200 TABLET, FILM COATED ORAL DAILY
Qty: 90 TABLET | Refills: 3 | Status: SHIPPED | OUTPATIENT
Start: 2022-11-11 | End: 2022-12-27 | Stop reason: ALTCHOICE

## 2022-12-01 ENCOUNTER — PATIENT MESSAGE (OUTPATIENT)
Dept: RHEUMATOLOGY | Facility: CLINIC | Age: 32
End: 2022-12-01
Payer: COMMERCIAL

## 2022-12-01 DIAGNOSIS — M25.50 POLYARTHRALGIA: Primary | ICD-10-CM

## 2022-12-01 RX ORDER — MELOXICAM 15 MG/1
15 TABLET ORAL DAILY
Qty: 30 TABLET | Refills: 3 | Status: SHIPPED | OUTPATIENT
Start: 2022-12-01 | End: 2023-02-27

## 2022-12-27 ENCOUNTER — OFFICE VISIT (OUTPATIENT)
Dept: RHEUMATOLOGY | Facility: CLINIC | Age: 32
End: 2022-12-27
Payer: COMMERCIAL

## 2022-12-27 ENCOUNTER — LAB VISIT (OUTPATIENT)
Dept: LAB | Facility: HOSPITAL | Age: 32
End: 2022-12-27
Attending: FAMILY MEDICINE
Payer: COMMERCIAL

## 2022-12-27 ENCOUNTER — PATIENT MESSAGE (OUTPATIENT)
Dept: RHEUMATOLOGY | Facility: CLINIC | Age: 32
End: 2022-12-27

## 2022-12-27 VITALS
HEIGHT: 59 IN | SYSTOLIC BLOOD PRESSURE: 90 MMHG | WEIGHT: 117.94 LBS | BODY MASS INDEX: 23.78 KG/M2 | DIASTOLIC BLOOD PRESSURE: 63 MMHG

## 2022-12-27 DIAGNOSIS — R76.8 ANA POSITIVE: ICD-10-CM

## 2022-12-27 DIAGNOSIS — R76.8 RHEUMATOID FACTOR POSITIVE: ICD-10-CM

## 2022-12-27 DIAGNOSIS — M05.79 RHEUMATOID ARTHRITIS INVOLVING MULTIPLE SITES WITH POSITIVE RHEUMATOID FACTOR: Primary | ICD-10-CM

## 2022-12-27 DIAGNOSIS — Z79.899 HIGH RISK MEDICATION USE: ICD-10-CM

## 2022-12-27 DIAGNOSIS — M05.79 RHEUMATOID ARTHRITIS INVOLVING MULTIPLE SITES WITH POSITIVE RHEUMATOID FACTOR: ICD-10-CM

## 2022-12-27 LAB
ALBUMIN SERPL BCP-MCNC: 3.6 G/DL (ref 3.5–5.2)
ALP SERPL-CCNC: 68 U/L (ref 55–135)
ALT SERPL W/O P-5'-P-CCNC: 12 U/L (ref 10–44)
ANION GAP SERPL CALC-SCNC: 8 MMOL/L (ref 8–16)
AST SERPL-CCNC: 16 U/L (ref 10–40)
BASOPHILS # BLD AUTO: 0.01 K/UL (ref 0–0.2)
BASOPHILS NFR BLD: 0.2 % (ref 0–1.9)
BILIRUB SERPL-MCNC: 0.2 MG/DL (ref 0.1–1)
BUN SERPL-MCNC: 15 MG/DL (ref 6–20)
CALCIUM SERPL-MCNC: 9.4 MG/DL (ref 8.7–10.5)
CHLORIDE SERPL-SCNC: 105 MMOL/L (ref 95–110)
CO2 SERPL-SCNC: 24 MMOL/L (ref 23–29)
CREAT SERPL-MCNC: 0.8 MG/DL (ref 0.5–1.4)
CRP SERPL-MCNC: 41.6 MG/L (ref 0–8.2)
DIFFERENTIAL METHOD: ABNORMAL
EOSINOPHIL # BLD AUTO: 0 K/UL (ref 0–0.5)
EOSINOPHIL NFR BLD: 0.6 % (ref 0–8)
ERYTHROCYTE [DISTWIDTH] IN BLOOD BY AUTOMATED COUNT: 12.4 % (ref 11.5–14.5)
ERYTHROCYTE [SEDIMENTATION RATE] IN BLOOD BY PHOTOMETRIC METHOD: 96 MM/HR (ref 0–36)
EST. GFR  (NO RACE VARIABLE): >60 ML/MIN/1.73 M^2
GLUCOSE SERPL-MCNC: 73 MG/DL (ref 70–110)
HCT VFR BLD AUTO: 38.5 % (ref 37–48.5)
HGB BLD-MCNC: 11.4 G/DL (ref 12–16)
IMM GRANULOCYTES # BLD AUTO: 0.01 K/UL (ref 0–0.04)
IMM GRANULOCYTES NFR BLD AUTO: 0.2 % (ref 0–0.5)
LYMPHOCYTES # BLD AUTO: 1.3 K/UL (ref 1–4.8)
LYMPHOCYTES NFR BLD: 24.8 % (ref 18–48)
MCH RBC QN AUTO: 26.7 PG (ref 27–31)
MCHC RBC AUTO-ENTMCNC: 29.6 G/DL (ref 32–36)
MCV RBC AUTO: 90 FL (ref 82–98)
MONOCYTES # BLD AUTO: 0.4 K/UL (ref 0.3–1)
MONOCYTES NFR BLD: 8.3 % (ref 4–15)
NEUTROPHILS # BLD AUTO: 3.4 K/UL (ref 1.8–7.7)
NEUTROPHILS NFR BLD: 65.9 % (ref 38–73)
NRBC BLD-RTO: 0 /100 WBC
PLATELET # BLD AUTO: 340 K/UL (ref 150–450)
PMV BLD AUTO: 9.9 FL (ref 9.2–12.9)
POTASSIUM SERPL-SCNC: 4.2 MMOL/L (ref 3.5–5.1)
PROT SERPL-MCNC: 8.4 G/DL (ref 6–8.4)
RBC # BLD AUTO: 4.27 M/UL (ref 4–5.4)
SODIUM SERPL-SCNC: 137 MMOL/L (ref 136–145)
WBC # BLD AUTO: 5.17 K/UL (ref 3.9–12.7)

## 2022-12-27 PROCEDURE — 1160F PR REVIEW ALL MEDS BY PRESCRIBER/CLIN PHARMACIST DOCUMENTED: ICD-10-PCS | Mod: CPTII,S$GLB,, | Performed by: STUDENT IN AN ORGANIZED HEALTH CARE EDUCATION/TRAINING PROGRAM

## 2022-12-27 PROCEDURE — 3008F PR BODY MASS INDEX (BMI) DOCUMENTED: ICD-10-PCS | Mod: CPTII,S$GLB,, | Performed by: STUDENT IN AN ORGANIZED HEALTH CARE EDUCATION/TRAINING PROGRAM

## 2022-12-27 PROCEDURE — 1159F PR MEDICATION LIST DOCUMENTED IN MEDICAL RECORD: ICD-10-PCS | Mod: CPTII,S$GLB,, | Performed by: STUDENT IN AN ORGANIZED HEALTH CARE EDUCATION/TRAINING PROGRAM

## 2022-12-27 PROCEDURE — 99215 PR OFFICE/OUTPT VISIT, EST, LEVL V, 40-54 MIN: ICD-10-PCS | Mod: S$GLB,,, | Performed by: STUDENT IN AN ORGANIZED HEALTH CARE EDUCATION/TRAINING PROGRAM

## 2022-12-27 PROCEDURE — 99999 PR PBB SHADOW E&M-EST. PATIENT-LVL III: CPT | Mod: PBBFAC,,, | Performed by: STUDENT IN AN ORGANIZED HEALTH CARE EDUCATION/TRAINING PROGRAM

## 2022-12-27 PROCEDURE — 3074F PR MOST RECENT SYSTOLIC BLOOD PRESSURE < 130 MM HG: ICD-10-PCS | Mod: CPTII,S$GLB,, | Performed by: STUDENT IN AN ORGANIZED HEALTH CARE EDUCATION/TRAINING PROGRAM

## 2022-12-27 PROCEDURE — 3078F PR MOST RECENT DIASTOLIC BLOOD PRESSURE < 80 MM HG: ICD-10-PCS | Mod: CPTII,S$GLB,, | Performed by: STUDENT IN AN ORGANIZED HEALTH CARE EDUCATION/TRAINING PROGRAM

## 2022-12-27 PROCEDURE — 80053 COMPREHEN METABOLIC PANEL: CPT | Performed by: STUDENT IN AN ORGANIZED HEALTH CARE EDUCATION/TRAINING PROGRAM

## 2022-12-27 PROCEDURE — 3008F BODY MASS INDEX DOCD: CPT | Mod: CPTII,S$GLB,, | Performed by: STUDENT IN AN ORGANIZED HEALTH CARE EDUCATION/TRAINING PROGRAM

## 2022-12-27 PROCEDURE — 99215 OFFICE O/P EST HI 40 MIN: CPT | Mod: S$GLB,,, | Performed by: STUDENT IN AN ORGANIZED HEALTH CARE EDUCATION/TRAINING PROGRAM

## 2022-12-27 PROCEDURE — 1160F RVW MEDS BY RX/DR IN RCRD: CPT | Mod: CPTII,S$GLB,, | Performed by: STUDENT IN AN ORGANIZED HEALTH CARE EDUCATION/TRAINING PROGRAM

## 2022-12-27 PROCEDURE — 1159F MED LIST DOCD IN RCRD: CPT | Mod: CPTII,S$GLB,, | Performed by: STUDENT IN AN ORGANIZED HEALTH CARE EDUCATION/TRAINING PROGRAM

## 2022-12-27 PROCEDURE — 86140 C-REACTIVE PROTEIN: CPT | Performed by: STUDENT IN AN ORGANIZED HEALTH CARE EDUCATION/TRAINING PROGRAM

## 2022-12-27 PROCEDURE — 3074F SYST BP LT 130 MM HG: CPT | Mod: CPTII,S$GLB,, | Performed by: STUDENT IN AN ORGANIZED HEALTH CARE EDUCATION/TRAINING PROGRAM

## 2022-12-27 PROCEDURE — 99999 PR PBB SHADOW E&M-EST. PATIENT-LVL III: ICD-10-PCS | Mod: PBBFAC,,, | Performed by: STUDENT IN AN ORGANIZED HEALTH CARE EDUCATION/TRAINING PROGRAM

## 2022-12-27 PROCEDURE — 85025 COMPLETE CBC W/AUTO DIFF WBC: CPT | Performed by: STUDENT IN AN ORGANIZED HEALTH CARE EDUCATION/TRAINING PROGRAM

## 2022-12-27 PROCEDURE — 36415 COLL VENOUS BLD VENIPUNCTURE: CPT | Performed by: STUDENT IN AN ORGANIZED HEALTH CARE EDUCATION/TRAINING PROGRAM

## 2022-12-27 PROCEDURE — 3078F DIAST BP <80 MM HG: CPT | Mod: CPTII,S$GLB,, | Performed by: STUDENT IN AN ORGANIZED HEALTH CARE EDUCATION/TRAINING PROGRAM

## 2022-12-27 PROCEDURE — 85652 RBC SED RATE AUTOMATED: CPT | Performed by: STUDENT IN AN ORGANIZED HEALTH CARE EDUCATION/TRAINING PROGRAM

## 2022-12-27 RX ORDER — METHOTREXATE 2.5 MG/1
15 TABLET ORAL
Qty: 24 TABLET | Refills: 3 | Status: SHIPPED | OUTPATIENT
Start: 2022-12-27 | End: 2023-01-30 | Stop reason: SDUPTHER

## 2022-12-27 RX ORDER — FOLIC ACID 1 MG/1
1 TABLET ORAL DAILY
Qty: 90 TABLET | Refills: 3 | Status: SHIPPED | OUTPATIENT
Start: 2022-12-27 | End: 2023-10-24 | Stop reason: SDUPTHER

## 2022-12-27 NOTE — PROGRESS NOTES
RHEUMATOLOGY CLINIC established patient VISIT    Reason for consult:- follow-up for rheumatoid arthritis    Chief complaints, HPI, ROS, EXAM, Assessment & Plans:-    Camelia Dumont is a 32 y.o. pleasant female who presents to follow-up for rheumatoid arthritis.  Patient was last seen November 10th at which time she was evaluated for polyarthritis with positive rheumatoid factor and positive EDI.  Subsequently found to have positive CCP antibody as well.  No evidence of lupus-like symptoms.  We started her on treatment for mild rheumatoid arthritis with hydroxychloroquine 200 mg b.i.d. which she has been compliant with.  Also continued naproxen but then switched to Mobic.  Patient states she is not gotten significant relief with therapy.  She estimates about 30-45% improvement.  She still has significant morning stiffness.  She states her right hand is very stiff.  Also reports left wrist pain as well as bilateral shoulder pain.  Otherwise rheumatologic review of systems is negative.  On exam, she has a number of tender interphalangeal and MCP joints but no obvious synovitis also with tenderness with palpation of shoulder joints bilaterally.    Reviewed all available old and outside pertinent medical records.    All lab results personally reviewed and interpreted by me.    1. Rheumatoid arthritis involving multiple sites with positive rheumatoid factor    2. Rheumatoid factor positive    3. EDI positive        Problem List Items Addressed This Visit    None  Visit Diagnoses       Rheumatoid arthritis involving multiple sites with positive rheumatoid factor    -  Primary    Relevant Medications    methotrexate 2.5 MG Tab    folic acid (FOLVITE) 1 MG tablet    Other Relevant Orders    CBC Auto Differential    Comprehensive Metabolic Panel    C-Reactive Protein    Sedimentation rate    Rheumatoid factor positive        EDI positive                Patient following up today for treatment of rheumatoid arthritis with  positive rheumatoid factor and CCP antibodies  also EDI positive, SSA positive but no other concerning symptoms for lupus or Sjogren's  Trialed hydroxychloroquine 200 mg daily for past month and a half and patient has not gotten good relief  Will plan to discontinue hydroxychloroquine and advance therapy to methotrexate 15 mg weekly with daily folic acid  Patient is on birth control and has no future plans for any more pregnancies  Provided ACR handout on methotrexate  Check labs today and safety labs monthly    # Follow up in about 2 months (around 2023).    Chronic comorbid conditions affecting medical decision making today:    History reviewed. No pertinent past medical history.    History reviewed. No pertinent surgical history.     Social History     Tobacco Use    Smoking status: Former     Years: 1.00     Types: Cigarettes     Quit date: 2008     Years since quittin.9    Smokeless tobacco: Never   Substance Use Topics    Alcohol use: Yes    Drug use: Never       Family History   Problem Relation Age of Onset    No Known Problems Mother     Hyperlipidemia Father        Review of patient's allergies indicates:  No Known Allergies    Medication List with Changes/Refills   New Medications    FOLIC ACID (FOLVITE) 1 MG TABLET    Take 1 tablet (1 mg total) by mouth once daily.    METHOTREXATE 2.5 MG TAB    Take 6 tablets (15 mg total) by mouth every 7 days.   Current Medications    DICLOFENAC SODIUM (VOLTAREN ARTHRITIS PAIN) 1 % GEL    Apply 2 g topically 4 (four) times daily.    JUNEL FE 1/20, , 1 MG-20 MCG (21)/75 MG (7) PER TABLET    Take 1 tablet by mouth once daily.    MELOXICAM (MOBIC) 15 MG TABLET    Take 1 tablet (15 mg total) by mouth once daily.    SERTRALINE (ZOLOFT) 50 MG TABLET    Take 50 mg by mouth once daily.   Discontinued Medications    HYDROXYCHLOROQUINE (PLAQUENIL) 200 MG TABLET    Take 1 tablet (200 mg total) by mouth once daily.         Disclaimer: This note was prepared using  voice recognition system and is likely to have sound alike errors and is not proofread.  Please message me with any questions.    40 minutes of total time spent on the encounter, which includes face to face time and non-face to face time preparing to see the patient (eg, review of tests), Obtaining and/or reviewing separately obtained history, Documenting clinical information in the electronic or other health record, Independently interpreting results (not separately reported) and communicating results to the patient/family/caregiver, or Care coordination (not separately reported).     Thank you for allowing me to participate in the care of Camelia Dumont.    Travis Quick MD

## 2022-12-28 NOTE — PROGRESS NOTES
CMP and CBC largely normal.  Inflammatory markers have increased supporting plan to start methotrexate.

## 2023-01-27 ENCOUNTER — LAB VISIT (OUTPATIENT)
Dept: LAB | Facility: HOSPITAL | Age: 33
End: 2023-01-27
Attending: FAMILY MEDICINE
Payer: COMMERCIAL

## 2023-01-27 DIAGNOSIS — M05.79 RHEUMATOID ARTHRITIS INVOLVING MULTIPLE SITES WITH POSITIVE RHEUMATOID FACTOR: ICD-10-CM

## 2023-01-27 LAB
ALBUMIN SERPL BCP-MCNC: 3.4 G/DL (ref 3.5–5.2)
ALP SERPL-CCNC: 67 U/L (ref 55–135)
ALT SERPL W/O P-5'-P-CCNC: 14 U/L (ref 10–44)
ANION GAP SERPL CALC-SCNC: 10 MMOL/L (ref 8–16)
AST SERPL-CCNC: 16 U/L (ref 10–40)
BASOPHILS # BLD AUTO: 0.02 K/UL (ref 0–0.2)
BASOPHILS NFR BLD: 0.2 % (ref 0–1.9)
BILIRUB SERPL-MCNC: 0.2 MG/DL (ref 0.1–1)
BUN SERPL-MCNC: 12 MG/DL (ref 6–20)
CALCIUM SERPL-MCNC: 9.1 MG/DL (ref 8.7–10.5)
CHLORIDE SERPL-SCNC: 103 MMOL/L (ref 95–110)
CO2 SERPL-SCNC: 23 MMOL/L (ref 23–29)
CREAT SERPL-MCNC: 0.9 MG/DL (ref 0.5–1.4)
CRP SERPL-MCNC: 46.7 MG/L (ref 0–8.2)
DIFFERENTIAL METHOD: ABNORMAL
EOSINOPHIL # BLD AUTO: 0 K/UL (ref 0–0.5)
EOSINOPHIL NFR BLD: 0.5 % (ref 0–8)
ERYTHROCYTE [DISTWIDTH] IN BLOOD BY AUTOMATED COUNT: 12.7 % (ref 11.5–14.5)
ERYTHROCYTE [SEDIMENTATION RATE] IN BLOOD BY PHOTOMETRIC METHOD: 66 MM/HR (ref 0–36)
EST. GFR  (NO RACE VARIABLE): >60 ML/MIN/1.73 M^2
GLUCOSE SERPL-MCNC: 124 MG/DL (ref 70–110)
HCT VFR BLD AUTO: 36.3 % (ref 37–48.5)
HGB BLD-MCNC: 11.1 G/DL (ref 12–16)
IMM GRANULOCYTES # BLD AUTO: 0.03 K/UL (ref 0–0.04)
IMM GRANULOCYTES NFR BLD AUTO: 0.3 % (ref 0–0.5)
LYMPHOCYTES # BLD AUTO: 1.2 K/UL (ref 1–4.8)
LYMPHOCYTES NFR BLD: 14.2 % (ref 18–48)
MCH RBC QN AUTO: 27.3 PG (ref 27–31)
MCHC RBC AUTO-ENTMCNC: 30.6 G/DL (ref 32–36)
MCV RBC AUTO: 89 FL (ref 82–98)
MONOCYTES # BLD AUTO: 0.6 K/UL (ref 0.3–1)
MONOCYTES NFR BLD: 6.3 % (ref 4–15)
NEUTROPHILS # BLD AUTO: 6.8 K/UL (ref 1.8–7.7)
NEUTROPHILS NFR BLD: 78.5 % (ref 38–73)
NRBC BLD-RTO: 0 /100 WBC
PLATELET # BLD AUTO: 336 K/UL (ref 150–450)
PMV BLD AUTO: 10.2 FL (ref 9.2–12.9)
POTASSIUM SERPL-SCNC: 4 MMOL/L (ref 3.5–5.1)
PROT SERPL-MCNC: 8.2 G/DL (ref 6–8.4)
RBC # BLD AUTO: 4.06 M/UL (ref 4–5.4)
SODIUM SERPL-SCNC: 136 MMOL/L (ref 136–145)
WBC # BLD AUTO: 8.67 K/UL (ref 3.9–12.7)

## 2023-01-27 PROCEDURE — 36415 COLL VENOUS BLD VENIPUNCTURE: CPT | Mod: PO | Performed by: STUDENT IN AN ORGANIZED HEALTH CARE EDUCATION/TRAINING PROGRAM

## 2023-01-27 PROCEDURE — 85652 RBC SED RATE AUTOMATED: CPT | Performed by: STUDENT IN AN ORGANIZED HEALTH CARE EDUCATION/TRAINING PROGRAM

## 2023-01-27 PROCEDURE — 86140 C-REACTIVE PROTEIN: CPT | Performed by: STUDENT IN AN ORGANIZED HEALTH CARE EDUCATION/TRAINING PROGRAM

## 2023-01-27 PROCEDURE — 80053 COMPREHEN METABOLIC PANEL: CPT | Performed by: STUDENT IN AN ORGANIZED HEALTH CARE EDUCATION/TRAINING PROGRAM

## 2023-01-27 PROCEDURE — 85025 COMPLETE CBC W/AUTO DIFF WBC: CPT | Performed by: STUDENT IN AN ORGANIZED HEALTH CARE EDUCATION/TRAINING PROGRAM

## 2023-01-30 DIAGNOSIS — M05.79 RHEUMATOID ARTHRITIS INVOLVING MULTIPLE SITES WITH POSITIVE RHEUMATOID FACTOR: ICD-10-CM

## 2023-01-30 RX ORDER — METHOTREXATE 2.5 MG/1
20 TABLET ORAL
Qty: 32 TABLET | Refills: 3 | Status: SHIPPED | OUTPATIENT
Start: 2023-01-30 | End: 2023-06-21 | Stop reason: SDUPTHER

## 2023-01-30 NOTE — PROGRESS NOTES
Inflammatory markers still elevated.  Please increase methotrexate to 20 mg weekly.  On the day of the week that you take the methotrexate, would take 4 tablets in the morning and 4 tablets in the evening.  This helps with absorption.  I will send update of the prescription.  Continue taking folic acid daily. .

## 2023-01-31 ENCOUNTER — PATIENT MESSAGE (OUTPATIENT)
Dept: RHEUMATOLOGY | Facility: CLINIC | Age: 33
End: 2023-01-31
Payer: COMMERCIAL

## 2023-02-28 ENCOUNTER — LAB VISIT (OUTPATIENT)
Dept: LAB | Facility: HOSPITAL | Age: 33
End: 2023-02-28
Attending: STUDENT IN AN ORGANIZED HEALTH CARE EDUCATION/TRAINING PROGRAM
Payer: COMMERCIAL

## 2023-02-28 ENCOUNTER — OFFICE VISIT (OUTPATIENT)
Dept: RHEUMATOLOGY | Facility: CLINIC | Age: 33
End: 2023-02-28
Payer: COMMERCIAL

## 2023-02-28 VITALS
WEIGHT: 117.75 LBS | DIASTOLIC BLOOD PRESSURE: 69 MMHG | HEIGHT: 59 IN | SYSTOLIC BLOOD PRESSURE: 107 MMHG | BODY MASS INDEX: 23.74 KG/M2 | HEART RATE: 88 BPM

## 2023-02-28 DIAGNOSIS — M05.79 RHEUMATOID ARTHRITIS INVOLVING MULTIPLE SITES WITH POSITIVE RHEUMATOID FACTOR: Primary | ICD-10-CM

## 2023-02-28 DIAGNOSIS — M05.79 RHEUMATOID ARTHRITIS INVOLVING MULTIPLE SITES WITH POSITIVE RHEUMATOID FACTOR: ICD-10-CM

## 2023-02-28 DIAGNOSIS — Z79.899 HIGH RISK MEDICATION USE: ICD-10-CM

## 2023-02-28 LAB
ALBUMIN SERPL BCP-MCNC: 3.8 G/DL (ref 3.5–5.2)
ALP SERPL-CCNC: 65 U/L (ref 55–135)
ALT SERPL W/O P-5'-P-CCNC: 12 U/L (ref 10–44)
ANION GAP SERPL CALC-SCNC: 7 MMOL/L (ref 8–16)
AST SERPL-CCNC: 17 U/L (ref 10–40)
BASOPHILS # BLD AUTO: 0.01 K/UL (ref 0–0.2)
BASOPHILS NFR BLD: 0.2 % (ref 0–1.9)
BILIRUB SERPL-MCNC: 0.2 MG/DL (ref 0.1–1)
BUN SERPL-MCNC: 11 MG/DL (ref 6–20)
CALCIUM SERPL-MCNC: 9 MG/DL (ref 8.7–10.5)
CHLORIDE SERPL-SCNC: 107 MMOL/L (ref 95–110)
CO2 SERPL-SCNC: 25 MMOL/L (ref 23–29)
CREAT SERPL-MCNC: 0.8 MG/DL (ref 0.5–1.4)
CRP SERPL-MCNC: 9.8 MG/L (ref 0–8.2)
DIFFERENTIAL METHOD: ABNORMAL
EOSINOPHIL # BLD AUTO: 0 K/UL (ref 0–0.5)
EOSINOPHIL NFR BLD: 0.2 % (ref 0–8)
ERYTHROCYTE [DISTWIDTH] IN BLOOD BY AUTOMATED COUNT: 14.7 % (ref 11.5–14.5)
ERYTHROCYTE [SEDIMENTATION RATE] IN BLOOD BY PHOTOMETRIC METHOD: 37 MM/HR (ref 0–36)
EST. GFR  (NO RACE VARIABLE): >60 ML/MIN/1.73 M^2
GLUCOSE SERPL-MCNC: 79 MG/DL (ref 70–110)
HCT VFR BLD AUTO: 37.5 % (ref 37–48.5)
HGB BLD-MCNC: 11.5 G/DL (ref 12–16)
IMM GRANULOCYTES # BLD AUTO: 0.01 K/UL (ref 0–0.04)
IMM GRANULOCYTES NFR BLD AUTO: 0.2 % (ref 0–0.5)
LYMPHOCYTES # BLD AUTO: 1.4 K/UL (ref 1–4.8)
LYMPHOCYTES NFR BLD: 31.9 % (ref 18–48)
MCH RBC QN AUTO: 27.6 PG (ref 27–31)
MCHC RBC AUTO-ENTMCNC: 30.7 G/DL (ref 32–36)
MCV RBC AUTO: 90 FL (ref 82–98)
MONOCYTES # BLD AUTO: 0.5 K/UL (ref 0.3–1)
MONOCYTES NFR BLD: 10 % (ref 4–15)
NEUTROPHILS # BLD AUTO: 2.6 K/UL (ref 1.8–7.7)
NEUTROPHILS NFR BLD: 57.5 % (ref 38–73)
NRBC BLD-RTO: 0 /100 WBC
PLATELET # BLD AUTO: 251 K/UL (ref 150–450)
PMV BLD AUTO: 10.8 FL (ref 9.2–12.9)
POTASSIUM SERPL-SCNC: 4 MMOL/L (ref 3.5–5.1)
PROT SERPL-MCNC: 7.4 G/DL (ref 6–8.4)
RBC # BLD AUTO: 4.16 M/UL (ref 4–5.4)
SODIUM SERPL-SCNC: 139 MMOL/L (ref 136–145)
WBC # BLD AUTO: 4.51 K/UL (ref 3.9–12.7)

## 2023-02-28 PROCEDURE — 85025 COMPLETE CBC W/AUTO DIFF WBC: CPT | Performed by: STUDENT IN AN ORGANIZED HEALTH CARE EDUCATION/TRAINING PROGRAM

## 2023-02-28 PROCEDURE — 3008F BODY MASS INDEX DOCD: CPT | Mod: CPTII,S$GLB,, | Performed by: STUDENT IN AN ORGANIZED HEALTH CARE EDUCATION/TRAINING PROGRAM

## 2023-02-28 PROCEDURE — 3008F PR BODY MASS INDEX (BMI) DOCUMENTED: ICD-10-PCS | Mod: CPTII,S$GLB,, | Performed by: STUDENT IN AN ORGANIZED HEALTH CARE EDUCATION/TRAINING PROGRAM

## 2023-02-28 PROCEDURE — 3078F PR MOST RECENT DIASTOLIC BLOOD PRESSURE < 80 MM HG: ICD-10-PCS | Mod: CPTII,S$GLB,, | Performed by: STUDENT IN AN ORGANIZED HEALTH CARE EDUCATION/TRAINING PROGRAM

## 2023-02-28 PROCEDURE — 86140 C-REACTIVE PROTEIN: CPT | Performed by: STUDENT IN AN ORGANIZED HEALTH CARE EDUCATION/TRAINING PROGRAM

## 2023-02-28 PROCEDURE — 1159F MED LIST DOCD IN RCRD: CPT | Mod: CPTII,S$GLB,, | Performed by: STUDENT IN AN ORGANIZED HEALTH CARE EDUCATION/TRAINING PROGRAM

## 2023-02-28 PROCEDURE — 85652 RBC SED RATE AUTOMATED: CPT | Performed by: STUDENT IN AN ORGANIZED HEALTH CARE EDUCATION/TRAINING PROGRAM

## 2023-02-28 PROCEDURE — 99214 PR OFFICE/OUTPT VISIT, EST, LEVL IV, 30-39 MIN: ICD-10-PCS | Mod: S$GLB,,, | Performed by: STUDENT IN AN ORGANIZED HEALTH CARE EDUCATION/TRAINING PROGRAM

## 2023-02-28 PROCEDURE — 3074F SYST BP LT 130 MM HG: CPT | Mod: CPTII,S$GLB,, | Performed by: STUDENT IN AN ORGANIZED HEALTH CARE EDUCATION/TRAINING PROGRAM

## 2023-02-28 PROCEDURE — 99999 PR PBB SHADOW E&M-EST. PATIENT-LVL III: ICD-10-PCS | Mod: PBBFAC,,, | Performed by: STUDENT IN AN ORGANIZED HEALTH CARE EDUCATION/TRAINING PROGRAM

## 2023-02-28 PROCEDURE — 99214 OFFICE O/P EST MOD 30 MIN: CPT | Mod: S$GLB,,, | Performed by: STUDENT IN AN ORGANIZED HEALTH CARE EDUCATION/TRAINING PROGRAM

## 2023-02-28 PROCEDURE — 1160F PR REVIEW ALL MEDS BY PRESCRIBER/CLIN PHARMACIST DOCUMENTED: ICD-10-PCS | Mod: CPTII,S$GLB,, | Performed by: STUDENT IN AN ORGANIZED HEALTH CARE EDUCATION/TRAINING PROGRAM

## 2023-02-28 PROCEDURE — 36415 COLL VENOUS BLD VENIPUNCTURE: CPT | Performed by: STUDENT IN AN ORGANIZED HEALTH CARE EDUCATION/TRAINING PROGRAM

## 2023-02-28 PROCEDURE — 1159F PR MEDICATION LIST DOCUMENTED IN MEDICAL RECORD: ICD-10-PCS | Mod: CPTII,S$GLB,, | Performed by: STUDENT IN AN ORGANIZED HEALTH CARE EDUCATION/TRAINING PROGRAM

## 2023-02-28 PROCEDURE — 1160F RVW MEDS BY RX/DR IN RCRD: CPT | Mod: CPTII,S$GLB,, | Performed by: STUDENT IN AN ORGANIZED HEALTH CARE EDUCATION/TRAINING PROGRAM

## 2023-02-28 PROCEDURE — 99999 PR PBB SHADOW E&M-EST. PATIENT-LVL III: CPT | Mod: PBBFAC,,, | Performed by: STUDENT IN AN ORGANIZED HEALTH CARE EDUCATION/TRAINING PROGRAM

## 2023-02-28 PROCEDURE — 3074F PR MOST RECENT SYSTOLIC BLOOD PRESSURE < 130 MM HG: ICD-10-PCS | Mod: CPTII,S$GLB,, | Performed by: STUDENT IN AN ORGANIZED HEALTH CARE EDUCATION/TRAINING PROGRAM

## 2023-02-28 PROCEDURE — 3078F DIAST BP <80 MM HG: CPT | Mod: CPTII,S$GLB,, | Performed by: STUDENT IN AN ORGANIZED HEALTH CARE EDUCATION/TRAINING PROGRAM

## 2023-02-28 PROCEDURE — 80053 COMPREHEN METABOLIC PANEL: CPT | Performed by: STUDENT IN AN ORGANIZED HEALTH CARE EDUCATION/TRAINING PROGRAM

## 2023-02-28 ASSESSMENT — ROUTINE ASSESSMENT OF PATIENT INDEX DATA (RAPID3): MDHAQ FUNCTION SCORE: 0

## 2023-03-02 NOTE — PROGRESS NOTES
RHEUMATOLOGY CLINIC established patient VISIT    Reason for consult:- follow-up for rheumatoid arthritis    Chief complaints, HPI, ROS, EXAM, Assessment & Plans:-    Camelia Dumont is a 32 y.o. pleasant female who presents to follow-up from her last visit with us on December 27th for rheumatoid arthritis.  At that last visit we discontinued hydroxychloroquine and began treatment with methotrexate.  She did have some improvement but her inflammatory markers continue to be elevated so we increased methotrexate to 20 mg weekly.  Since then she is had very good relief of her symptoms.  States about 95% improved.  Denies any joint swelling or pain today.  Her morning stiffness is mostly resolved.  She does continue to have some fatigue.  States her sleep is poor.  Rheumatologic review of systems otherwise negative.No evidence of synovitis, dactylitis or enthesitis.    Reviewed all available old and outside pertinent medical records.    All lab results personally reviewed and interpreted by me.    1. Rheumatoid arthritis involving multiple sites with positive rheumatoid factor    2. High risk medication use        Problem List Items Addressed This Visit    None  Visit Diagnoses       Rheumatoid arthritis involving multiple sites with positive rheumatoid factor    -  Primary    High risk medication use                Patient following up today for treatment of rheumatoid arthritis with positive rheumatoid factor and CCP antibodies  Trialed hydroxychloroquine but did not have good relief and continued to have elevated inflammatory markers  Started on methotrexate 15 mg weekly initially now increased to 20 mg weekly  She has had very good relief of her symptoms with this dosage  Patient is on birth control and has no future plans for anymore pregnancies  We will continue current therapy  Counseled on sleep hygiene    # Follow up in about 3 months (around 5/28/2023).    Chronic comorbid conditions affecting medical decision  making today:    History reviewed. No pertinent past medical history.    History reviewed. No pertinent surgical history.     Social History     Tobacco Use    Smoking status: Former     Years: 1.00     Types: Cigarettes     Quit date: 1/1/2008     Years since quitting: 15.1    Smokeless tobacco: Never   Substance Use Topics    Alcohol use: Yes    Drug use: Never       Family History   Problem Relation Age of Onset    No Known Problems Mother     Hyperlipidemia Father        Review of patient's allergies indicates:  No Known Allergies    Medication List with Changes/Refills   Current Medications    DICLOFENAC SODIUM (VOLTAREN ARTHRITIS PAIN) 1 % GEL    Apply 2 g topically 4 (four) times daily.    FOLIC ACID (FOLVITE) 1 MG TABLET    Take 1 tablet (1 mg total) by mouth once daily.    JUNEL FE 1/20, 28, 1 MG-20 MCG (21)/75 MG (7) PER TABLET    Take 1 tablet by mouth once daily.    METHOTREXATE 2.5 MG TAB    Take 8 tablets (20 mg total) by mouth every 7 days.   Discontinued Medications    MELOXICAM (MOBIC) 15 MG TABLET    TAKE 1 TABLET(15 MG) BY MOUTH EVERY DAY    SERTRALINE (ZOLOFT) 50 MG TABLET    Take 50 mg by mouth once daily.         Disclaimer: This note was prepared using voice recognition system and is likely to have sound alike errors and is not proofread.  Please message me with any questions.    35 minutes of total time spent on the encounter, which includes face to face time and non-face to face time preparing to see the patient (eg, review of tests), Obtaining and/or reviewing separately obtained history, Documenting clinical information in the electronic or other health record, Independently interpreting results (not separately reported) and communicating results to the patient/family/caregiver, or Care coordination (not separately reported).     Thank you for allowing me to participate in the care of Camelia Dumont.    Travis Quick MD

## 2023-04-03 ENCOUNTER — PATIENT MESSAGE (OUTPATIENT)
Dept: RHEUMATOLOGY | Facility: CLINIC | Age: 33
End: 2023-04-03
Payer: COMMERCIAL

## 2023-04-03 DIAGNOSIS — M05.79 RHEUMATOID ARTHRITIS INVOLVING MULTIPLE SITES WITH POSITIVE RHEUMATOID FACTOR: Primary | ICD-10-CM

## 2023-04-03 RX ORDER — METHYLPREDNISOLONE 4 MG/1
TABLET ORAL
Qty: 21 EACH | Refills: 0 | Status: SHIPPED | OUTPATIENT
Start: 2023-04-03 | End: 2023-04-24

## 2023-05-15 ENCOUNTER — PATIENT MESSAGE (OUTPATIENT)
Dept: RHEUMATOLOGY | Facility: CLINIC | Age: 33
End: 2023-05-15
Payer: COMMERCIAL

## 2023-05-23 ENCOUNTER — PATIENT MESSAGE (OUTPATIENT)
Dept: RHEUMATOLOGY | Facility: CLINIC | Age: 33
End: 2023-05-23
Payer: COMMERCIAL

## 2023-05-27 ENCOUNTER — LAB VISIT (OUTPATIENT)
Dept: LAB | Facility: HOSPITAL | Age: 33
End: 2023-05-27
Attending: STUDENT IN AN ORGANIZED HEALTH CARE EDUCATION/TRAINING PROGRAM
Payer: COMMERCIAL

## 2023-05-27 DIAGNOSIS — M05.79 RHEUMATOID ARTHRITIS INVOLVING MULTIPLE SITES WITH POSITIVE RHEUMATOID FACTOR: ICD-10-CM

## 2023-05-27 LAB
ALBUMIN SERPL BCP-MCNC: 3.8 G/DL (ref 3.5–5.2)
ALP SERPL-CCNC: 86 U/L (ref 55–135)
ALT SERPL W/O P-5'-P-CCNC: 13 U/L (ref 10–44)
ANION GAP SERPL CALC-SCNC: 10 MMOL/L (ref 8–16)
AST SERPL-CCNC: 16 U/L (ref 10–40)
BASOPHILS # BLD AUTO: 0.01 K/UL (ref 0–0.2)
BASOPHILS NFR BLD: 0.2 % (ref 0–1.9)
BILIRUB SERPL-MCNC: 0.3 MG/DL (ref 0.1–1)
BUN SERPL-MCNC: 13 MG/DL (ref 6–20)
CALCIUM SERPL-MCNC: 9.2 MG/DL (ref 8.7–10.5)
CHLORIDE SERPL-SCNC: 105 MMOL/L (ref 95–110)
CO2 SERPL-SCNC: 24 MMOL/L (ref 23–29)
CREAT SERPL-MCNC: 0.8 MG/DL (ref 0.5–1.4)
CRP SERPL-MCNC: 10.5 MG/L (ref 0–8.2)
DIFFERENTIAL METHOD: ABNORMAL
EOSINOPHIL # BLD AUTO: 0.1 K/UL (ref 0–0.5)
EOSINOPHIL NFR BLD: 1.8 % (ref 0–8)
ERYTHROCYTE [DISTWIDTH] IN BLOOD BY AUTOMATED COUNT: 13.6 % (ref 11.5–14.5)
ERYTHROCYTE [SEDIMENTATION RATE] IN BLOOD BY PHOTOMETRIC METHOD: 63 MM/HR (ref 0–36)
EST. GFR  (NO RACE VARIABLE): >60 ML/MIN/1.73 M^2
GLUCOSE SERPL-MCNC: 95 MG/DL (ref 70–110)
HCT VFR BLD AUTO: 38.1 % (ref 37–48.5)
HGB BLD-MCNC: 12.1 G/DL (ref 12–16)
IMM GRANULOCYTES # BLD AUTO: 0.01 K/UL (ref 0–0.04)
IMM GRANULOCYTES NFR BLD AUTO: 0.2 % (ref 0–0.5)
LYMPHOCYTES # BLD AUTO: 1.2 K/UL (ref 1–4.8)
LYMPHOCYTES NFR BLD: 27.1 % (ref 18–48)
MCH RBC QN AUTO: 28.6 PG (ref 27–31)
MCHC RBC AUTO-ENTMCNC: 31.8 G/DL (ref 32–36)
MCV RBC AUTO: 90 FL (ref 82–98)
MONOCYTES # BLD AUTO: 0.4 K/UL (ref 0.3–1)
MONOCYTES NFR BLD: 8.6 % (ref 4–15)
NEUTROPHILS # BLD AUTO: 2.8 K/UL (ref 1.8–7.7)
NEUTROPHILS NFR BLD: 62.1 % (ref 38–73)
NRBC BLD-RTO: 0 /100 WBC
PLATELET # BLD AUTO: 270 K/UL (ref 150–450)
PMV BLD AUTO: 10.5 FL (ref 9.2–12.9)
POTASSIUM SERPL-SCNC: 4.1 MMOL/L (ref 3.5–5.1)
PROT SERPL-MCNC: 8 G/DL (ref 6–8.4)
RBC # BLD AUTO: 4.23 M/UL (ref 4–5.4)
SODIUM SERPL-SCNC: 139 MMOL/L (ref 136–145)
WBC # BLD AUTO: 4.51 K/UL (ref 3.9–12.7)

## 2023-05-27 PROCEDURE — 85025 COMPLETE CBC W/AUTO DIFF WBC: CPT | Performed by: STUDENT IN AN ORGANIZED HEALTH CARE EDUCATION/TRAINING PROGRAM

## 2023-05-27 PROCEDURE — 85652 RBC SED RATE AUTOMATED: CPT | Performed by: STUDENT IN AN ORGANIZED HEALTH CARE EDUCATION/TRAINING PROGRAM

## 2023-05-27 PROCEDURE — 86140 C-REACTIVE PROTEIN: CPT | Performed by: STUDENT IN AN ORGANIZED HEALTH CARE EDUCATION/TRAINING PROGRAM

## 2023-05-27 PROCEDURE — 80053 COMPREHEN METABOLIC PANEL: CPT | Performed by: STUDENT IN AN ORGANIZED HEALTH CARE EDUCATION/TRAINING PROGRAM

## 2023-05-27 PROCEDURE — 36415 COLL VENOUS BLD VENIPUNCTURE: CPT | Performed by: STUDENT IN AN ORGANIZED HEALTH CARE EDUCATION/TRAINING PROGRAM

## 2023-05-29 ENCOUNTER — PATIENT MESSAGE (OUTPATIENT)
Dept: RHEUMATOLOGY | Facility: CLINIC | Age: 33
End: 2023-05-29
Payer: COMMERCIAL

## 2023-05-29 NOTE — TELEPHONE ENCOUNTER
Worsening fatigue could be secondary to higher methotrexate dose.  Advise patient to take Mucinex DM- 1 tablet with methotrexate and the next dose 12 hours after methotrexate.  If it fails to improve fatigue, we will start her all Rinvoq.  Please repeat all 4 labs in 2 weeks.  Thanks.

## 2023-05-29 NOTE — PROGRESS NOTES
CBC and CMP are stable.  Inflammatory markers elevated from previous.  How are your symptoms?  Any recent illness?

## 2023-06-21 DIAGNOSIS — M05.79 RHEUMATOID ARTHRITIS INVOLVING MULTIPLE SITES WITH POSITIVE RHEUMATOID FACTOR: ICD-10-CM

## 2023-06-21 RX ORDER — METHOTREXATE 2.5 MG/1
20 TABLET ORAL
Qty: 32 TABLET | Refills: 3 | Status: SHIPPED | OUTPATIENT
Start: 2023-06-21 | End: 2023-10-10 | Stop reason: SDUPTHER

## 2023-07-01 ENCOUNTER — LAB VISIT (OUTPATIENT)
Dept: LAB | Facility: HOSPITAL | Age: 33
End: 2023-07-01
Attending: STUDENT IN AN ORGANIZED HEALTH CARE EDUCATION/TRAINING PROGRAM
Payer: COMMERCIAL

## 2023-07-01 DIAGNOSIS — M05.79 RHEUMATOID ARTHRITIS INVOLVING MULTIPLE SITES WITH POSITIVE RHEUMATOID FACTOR: ICD-10-CM

## 2023-07-01 LAB
ALBUMIN SERPL BCP-MCNC: 3.7 G/DL (ref 3.5–5.2)
ALP SERPL-CCNC: 70 U/L (ref 55–135)
ALT SERPL W/O P-5'-P-CCNC: 12 U/L (ref 10–44)
ANION GAP SERPL CALC-SCNC: 12 MMOL/L (ref 8–16)
AST SERPL-CCNC: 16 U/L (ref 10–40)
BASOPHILS # BLD AUTO: 0.01 K/UL (ref 0–0.2)
BASOPHILS NFR BLD: 0.2 % (ref 0–1.9)
BILIRUB SERPL-MCNC: 0.3 MG/DL (ref 0.1–1)
BUN SERPL-MCNC: 11 MG/DL (ref 6–20)
CALCIUM SERPL-MCNC: 9.2 MG/DL (ref 8.7–10.5)
CHLORIDE SERPL-SCNC: 106 MMOL/L (ref 95–110)
CO2 SERPL-SCNC: 23 MMOL/L (ref 23–29)
CREAT SERPL-MCNC: 0.8 MG/DL (ref 0.5–1.4)
CRP SERPL-MCNC: 11.5 MG/L (ref 0–8.2)
DIFFERENTIAL METHOD: ABNORMAL
EOSINOPHIL # BLD AUTO: 0 K/UL (ref 0–0.5)
EOSINOPHIL NFR BLD: 0.7 % (ref 0–8)
ERYTHROCYTE [DISTWIDTH] IN BLOOD BY AUTOMATED COUNT: 13.9 % (ref 11.5–14.5)
ERYTHROCYTE [SEDIMENTATION RATE] IN BLOOD BY PHOTOMETRIC METHOD: 29 MM/HR (ref 0–36)
EST. GFR  (NO RACE VARIABLE): >60 ML/MIN/1.73 M^2
GLUCOSE SERPL-MCNC: 109 MG/DL (ref 70–110)
HCT VFR BLD AUTO: 37.5 % (ref 37–48.5)
HGB BLD-MCNC: 11.8 G/DL (ref 12–16)
IMM GRANULOCYTES # BLD AUTO: 0.01 K/UL (ref 0–0.04)
IMM GRANULOCYTES NFR BLD AUTO: 0.2 % (ref 0–0.5)
LYMPHOCYTES # BLD AUTO: 1.2 K/UL (ref 1–4.8)
LYMPHOCYTES NFR BLD: 27.4 % (ref 18–48)
MCH RBC QN AUTO: 29.3 PG (ref 27–31)
MCHC RBC AUTO-ENTMCNC: 31.5 G/DL (ref 32–36)
MCV RBC AUTO: 93 FL (ref 82–98)
MONOCYTES # BLD AUTO: 0.4 K/UL (ref 0.3–1)
MONOCYTES NFR BLD: 7.9 % (ref 4–15)
NEUTROPHILS # BLD AUTO: 2.9 K/UL (ref 1.8–7.7)
NEUTROPHILS NFR BLD: 63.6 % (ref 38–73)
NRBC BLD-RTO: 0 /100 WBC
PLATELET # BLD AUTO: 250 K/UL (ref 150–450)
PMV BLD AUTO: 11.2 FL (ref 9.2–12.9)
POTASSIUM SERPL-SCNC: 4 MMOL/L (ref 3.5–5.1)
PROT SERPL-MCNC: 7.8 G/DL (ref 6–8.4)
RBC # BLD AUTO: 4.03 M/UL (ref 4–5.4)
SODIUM SERPL-SCNC: 141 MMOL/L (ref 136–145)
WBC # BLD AUTO: 4.53 K/UL (ref 3.9–12.7)

## 2023-07-01 PROCEDURE — 80053 COMPREHEN METABOLIC PANEL: CPT | Performed by: STUDENT IN AN ORGANIZED HEALTH CARE EDUCATION/TRAINING PROGRAM

## 2023-07-01 PROCEDURE — 36415 COLL VENOUS BLD VENIPUNCTURE: CPT | Performed by: STUDENT IN AN ORGANIZED HEALTH CARE EDUCATION/TRAINING PROGRAM

## 2023-07-01 PROCEDURE — 85025 COMPLETE CBC W/AUTO DIFF WBC: CPT | Performed by: STUDENT IN AN ORGANIZED HEALTH CARE EDUCATION/TRAINING PROGRAM

## 2023-07-01 PROCEDURE — 86140 C-REACTIVE PROTEIN: CPT | Performed by: STUDENT IN AN ORGANIZED HEALTH CARE EDUCATION/TRAINING PROGRAM

## 2023-07-01 PROCEDURE — 85652 RBC SED RATE AUTOMATED: CPT | Performed by: STUDENT IN AN ORGANIZED HEALTH CARE EDUCATION/TRAINING PROGRAM

## 2023-10-10 DIAGNOSIS — M05.79 RHEUMATOID ARTHRITIS INVOLVING MULTIPLE SITES WITH POSITIVE RHEUMATOID FACTOR: ICD-10-CM

## 2023-10-10 RX ORDER — METHOTREXATE 2.5 MG/1
20 TABLET ORAL
Qty: 32 TABLET | Refills: 3 | Status: SHIPPED | OUTPATIENT
Start: 2023-10-10 | End: 2023-10-24 | Stop reason: SDUPTHER

## 2023-10-23 ENCOUNTER — PATIENT MESSAGE (OUTPATIENT)
Dept: RHEUMATOLOGY | Facility: CLINIC | Age: 33
End: 2023-10-23
Payer: COMMERCIAL

## 2023-10-23 DIAGNOSIS — M05.79 RHEUMATOID ARTHRITIS INVOLVING MULTIPLE SITES WITH POSITIVE RHEUMATOID FACTOR: ICD-10-CM

## 2023-10-24 RX ORDER — FOLIC ACID 1 MG/1
1 TABLET ORAL DAILY
Qty: 90 TABLET | Refills: 3 | Status: SHIPPED | OUTPATIENT
Start: 2023-10-24 | End: 2023-12-19 | Stop reason: SDUPTHER

## 2023-10-24 RX ORDER — METHOTREXATE 2.5 MG/1
20 TABLET ORAL
Qty: 32 TABLET | Refills: 3 | Status: SHIPPED | OUTPATIENT
Start: 2023-10-24 | End: 2023-12-19 | Stop reason: SDUPTHER

## 2023-12-19 ENCOUNTER — LAB VISIT (OUTPATIENT)
Dept: LAB | Facility: HOSPITAL | Age: 33
End: 2023-12-19
Attending: STUDENT IN AN ORGANIZED HEALTH CARE EDUCATION/TRAINING PROGRAM
Payer: COMMERCIAL

## 2023-12-19 ENCOUNTER — OFFICE VISIT (OUTPATIENT)
Dept: RHEUMATOLOGY | Facility: CLINIC | Age: 33
End: 2023-12-19
Payer: COMMERCIAL

## 2023-12-19 VITALS
HEART RATE: 77 BPM | SYSTOLIC BLOOD PRESSURE: 108 MMHG | HEIGHT: 59 IN | DIASTOLIC BLOOD PRESSURE: 58 MMHG | WEIGHT: 120.56 LBS | BODY MASS INDEX: 24.31 KG/M2

## 2023-12-19 DIAGNOSIS — M05.79 RHEUMATOID ARTHRITIS INVOLVING MULTIPLE SITES WITH POSITIVE RHEUMATOID FACTOR: ICD-10-CM

## 2023-12-19 LAB
ALBUMIN SERPL BCP-MCNC: 3.6 G/DL (ref 3.5–5.2)
ALP SERPL-CCNC: 65 U/L (ref 55–135)
ALT SERPL W/O P-5'-P-CCNC: 10 U/L (ref 10–44)
ANION GAP SERPL CALC-SCNC: 9 MMOL/L (ref 8–16)
AST SERPL-CCNC: 19 U/L (ref 10–40)
BASOPHILS # BLD AUTO: 0.01 K/UL (ref 0–0.2)
BASOPHILS NFR BLD: 0.2 % (ref 0–1.9)
BILIRUB SERPL-MCNC: 0.2 MG/DL (ref 0.1–1)
BUN SERPL-MCNC: 12 MG/DL (ref 6–20)
CALCIUM SERPL-MCNC: 8.9 MG/DL (ref 8.7–10.5)
CHLORIDE SERPL-SCNC: 104 MMOL/L (ref 95–110)
CO2 SERPL-SCNC: 25 MMOL/L (ref 23–29)
CREAT SERPL-MCNC: 0.9 MG/DL (ref 0.5–1.4)
CRP SERPL-MCNC: 11.9 MG/L (ref 0–8.2)
DIFFERENTIAL METHOD: ABNORMAL
EOSINOPHIL # BLD AUTO: 0 K/UL (ref 0–0.5)
EOSINOPHIL NFR BLD: 0.2 % (ref 0–8)
ERYTHROCYTE [DISTWIDTH] IN BLOOD BY AUTOMATED COUNT: 12.2 % (ref 11.5–14.5)
EST. GFR  (NO RACE VARIABLE): >60 ML/MIN/1.73 M^2
GLUCOSE SERPL-MCNC: 78 MG/DL (ref 70–110)
HCT VFR BLD AUTO: 37.1 % (ref 37–48.5)
HGB BLD-MCNC: 11.7 G/DL (ref 12–16)
IMM GRANULOCYTES # BLD AUTO: 0.01 K/UL (ref 0–0.04)
IMM GRANULOCYTES NFR BLD AUTO: 0.2 % (ref 0–0.5)
LYMPHOCYTES # BLD AUTO: 1.8 K/UL (ref 1–4.8)
LYMPHOCYTES NFR BLD: 36.3 % (ref 18–48)
MCH RBC QN AUTO: 27.6 PG (ref 27–31)
MCHC RBC AUTO-ENTMCNC: 31.5 G/DL (ref 32–36)
MCV RBC AUTO: 88 FL (ref 82–98)
MONOCYTES # BLD AUTO: 0.5 K/UL (ref 0.3–1)
MONOCYTES NFR BLD: 9.7 % (ref 4–15)
NEUTROPHILS # BLD AUTO: 2.7 K/UL (ref 1.8–7.7)
NEUTROPHILS NFR BLD: 53.4 % (ref 38–73)
NRBC BLD-RTO: 0 /100 WBC
PLATELET # BLD AUTO: 243 K/UL (ref 150–450)
PMV BLD AUTO: 10.9 FL (ref 9.2–12.9)
POTASSIUM SERPL-SCNC: 3.9 MMOL/L (ref 3.5–5.1)
PROT SERPL-MCNC: 7.9 G/DL (ref 6–8.4)
RBC # BLD AUTO: 4.24 M/UL (ref 4–5.4)
SODIUM SERPL-SCNC: 138 MMOL/L (ref 136–145)
WBC # BLD AUTO: 5.04 K/UL (ref 3.9–12.7)

## 2023-12-19 PROCEDURE — 1160F RVW MEDS BY RX/DR IN RCRD: CPT | Mod: CPTII,S$GLB,, | Performed by: STUDENT IN AN ORGANIZED HEALTH CARE EDUCATION/TRAINING PROGRAM

## 2023-12-19 PROCEDURE — 85025 COMPLETE CBC W/AUTO DIFF WBC: CPT | Performed by: STUDENT IN AN ORGANIZED HEALTH CARE EDUCATION/TRAINING PROGRAM

## 2023-12-19 PROCEDURE — 1160F PR REVIEW ALL MEDS BY PRESCRIBER/CLIN PHARMACIST DOCUMENTED: ICD-10-PCS | Mod: CPTII,S$GLB,, | Performed by: STUDENT IN AN ORGANIZED HEALTH CARE EDUCATION/TRAINING PROGRAM

## 2023-12-19 PROCEDURE — 99999 PR PBB SHADOW E&M-EST. PATIENT-LVL IV: CPT | Mod: PBBFAC,,, | Performed by: STUDENT IN AN ORGANIZED HEALTH CARE EDUCATION/TRAINING PROGRAM

## 2023-12-19 PROCEDURE — 99215 OFFICE O/P EST HI 40 MIN: CPT | Mod: S$GLB,,, | Performed by: STUDENT IN AN ORGANIZED HEALTH CARE EDUCATION/TRAINING PROGRAM

## 2023-12-19 PROCEDURE — 99999 PR PBB SHADOW E&M-EST. PATIENT-LVL IV: ICD-10-PCS | Mod: PBBFAC,,, | Performed by: STUDENT IN AN ORGANIZED HEALTH CARE EDUCATION/TRAINING PROGRAM

## 2023-12-19 PROCEDURE — 80053 COMPREHEN METABOLIC PANEL: CPT | Performed by: STUDENT IN AN ORGANIZED HEALTH CARE EDUCATION/TRAINING PROGRAM

## 2023-12-19 PROCEDURE — 36415 COLL VENOUS BLD VENIPUNCTURE: CPT | Performed by: STUDENT IN AN ORGANIZED HEALTH CARE EDUCATION/TRAINING PROGRAM

## 2023-12-19 PROCEDURE — 3008F BODY MASS INDEX DOCD: CPT | Mod: CPTII,S$GLB,, | Performed by: STUDENT IN AN ORGANIZED HEALTH CARE EDUCATION/TRAINING PROGRAM

## 2023-12-19 PROCEDURE — 85652 RBC SED RATE AUTOMATED: CPT | Performed by: STUDENT IN AN ORGANIZED HEALTH CARE EDUCATION/TRAINING PROGRAM

## 2023-12-19 PROCEDURE — 3078F DIAST BP <80 MM HG: CPT | Mod: CPTII,S$GLB,, | Performed by: STUDENT IN AN ORGANIZED HEALTH CARE EDUCATION/TRAINING PROGRAM

## 2023-12-19 PROCEDURE — 3008F PR BODY MASS INDEX (BMI) DOCUMENTED: ICD-10-PCS | Mod: CPTII,S$GLB,, | Performed by: STUDENT IN AN ORGANIZED HEALTH CARE EDUCATION/TRAINING PROGRAM

## 2023-12-19 PROCEDURE — 99215 PR OFFICE/OUTPT VISIT, EST, LEVL V, 40-54 MIN: ICD-10-PCS | Mod: S$GLB,,, | Performed by: STUDENT IN AN ORGANIZED HEALTH CARE EDUCATION/TRAINING PROGRAM

## 2023-12-19 PROCEDURE — 86140 C-REACTIVE PROTEIN: CPT | Performed by: STUDENT IN AN ORGANIZED HEALTH CARE EDUCATION/TRAINING PROGRAM

## 2023-12-19 PROCEDURE — 3074F PR MOST RECENT SYSTOLIC BLOOD PRESSURE < 130 MM HG: ICD-10-PCS | Mod: CPTII,S$GLB,, | Performed by: STUDENT IN AN ORGANIZED HEALTH CARE EDUCATION/TRAINING PROGRAM

## 2023-12-19 PROCEDURE — 1159F MED LIST DOCD IN RCRD: CPT | Mod: CPTII,S$GLB,, | Performed by: STUDENT IN AN ORGANIZED HEALTH CARE EDUCATION/TRAINING PROGRAM

## 2023-12-19 PROCEDURE — 3074F SYST BP LT 130 MM HG: CPT | Mod: CPTII,S$GLB,, | Performed by: STUDENT IN AN ORGANIZED HEALTH CARE EDUCATION/TRAINING PROGRAM

## 2023-12-19 PROCEDURE — 1159F PR MEDICATION LIST DOCUMENTED IN MEDICAL RECORD: ICD-10-PCS | Mod: CPTII,S$GLB,, | Performed by: STUDENT IN AN ORGANIZED HEALTH CARE EDUCATION/TRAINING PROGRAM

## 2023-12-19 PROCEDURE — 3078F PR MOST RECENT DIASTOLIC BLOOD PRESSURE < 80 MM HG: ICD-10-PCS | Mod: CPTII,S$GLB,, | Performed by: STUDENT IN AN ORGANIZED HEALTH CARE EDUCATION/TRAINING PROGRAM

## 2023-12-19 RX ORDER — FOLIC ACID 1 MG/1
1 TABLET ORAL DAILY
Qty: 90 TABLET | Refills: 3 | Status: SHIPPED | OUTPATIENT
Start: 2023-12-19 | End: 2024-12-18

## 2023-12-19 RX ORDER — METHOTREXATE 2.5 MG/1
20 TABLET ORAL
Qty: 32 TABLET | Refills: 3 | Status: SHIPPED | OUTPATIENT
Start: 2023-12-19 | End: 2024-02-12 | Stop reason: SDUPTHER

## 2023-12-19 NOTE — PROGRESS NOTES
RHEUMATOLOGY CLINIC ESTABLISHED PATIENT VISIT    Reason for consult:- seropositive rheumatoid arthritis    Chief complaints, HPI, ROS, EXAM, Assessment & Plans:-    Camelia Dumont is a 33 y.o. pleasant female who presents to follow up for seropositive rheumatoid arthritis.  Her last visit was in February.  Unfortunately she had a lapse in insurance and has been off of her medication for about 4 months.  She has noticed a little bit of return of pain and stiffness in her joints.  She denies very active or tender swollen joints today.  She has developed some itchiness.  Improved with Allergy medicines.  Still with fatigue although she does state that she has increased stress with her work.  Rheumatologic review of systems otherwise negative.  No synovitis, dactylitis or enthesitis.  No rashes noted.    Reviewed all available old and outside pertinent medical records.    All lab results personally reviewed and interpreted by me.    1. Rheumatoid arthritis involving multiple sites with positive rheumatoid factor        Problem List Items Addressed This Visit    None  Visit Diagnoses       Rheumatoid arthritis involving multiple sites with positive rheumatoid factor        Relevant Medications    methotrexate 2.5 MG Tab    folic acid (FOLVITE) 1 MG tablet            Patient following up today for seropositive rheumatoid arthritis   Previously doing well on methotrexate 20 mg weekly and daily folic acid  She has had a lapse in therapy for about 4 months due to insurance  We will resume therapy with methotrexate 20 mg weekly and daily folic acid  Drug therapy requiring intensive monitoring for toxicity  High Risk Medication Monitoring encounter  No current medication related issues, no evidence of toxicity  I ordered labs for toxicity monitoring, have personally reviewed the findings, and discussed them with the patient.  Pending labs will be sent via the portal  Compromised immune system secondary to autoimmune disease  and/or use of immunosuppressive drugs.  Monitor carefully for infections.  Advised patient to get immediate medical care if any infection arises.  Also advised strict adherence age-appropriate vaccinations and cancer screenings with PCP.  Patient advised to hold DMARD and/or biologic therapy for signs of infection or for surgery. If you are unsure what to do please call our office for instruction.[facility] [phone]      # Follow up in about 4 months (around 4/19/2024).    Chronic comorbid conditions affecting medical decision making today:    History reviewed. No pertinent past medical history.    Past Surgical History:   Procedure Laterality Date    BREAST SURGERY  10/2019        Social History     Tobacco Use    Smoking status: Former     Current packs/day: 0.00     Types: Cigarettes     Start date: 1/1/2007     Quit date: 1/1/2008     Years since quitting: 15.9    Smokeless tobacco: Never   Substance Use Topics    Alcohol use: Yes    Drug use: Never       Family History   Problem Relation Age of Onset    No Known Problems Mother     Hyperlipidemia Father        Review of patient's allergies indicates:  No Known Allergies    Medication List with Changes/Refills   Current Medications    JUNEL FE 1/20, 28, 1 MG-20 MCG (21)/75 MG (7) PER TABLET    Take 1 tablet by mouth once daily.   Changed and/or Refilled Medications    Modified Medication Previous Medication    FOLIC ACID (FOLVITE) 1 MG TABLET folic acid (FOLVITE) 1 MG tablet       Take 1 tablet (1 mg total) by mouth once daily.    Take 1 tablet (1 mg total) by mouth once daily.    METHOTREXATE 2.5 MG TAB methotrexate 2.5 MG Tab       Take 8 tablets (20 mg total) by mouth every 7 days.    Take 8 tablets (20 mg total) by mouth every 7 days.         Disclaimer: This note was prepared using voice recognition system and is likely to have sound alike errors and is not proofread.  Please message me with any questions.    32 minutes of total time  spent on the encounter, which includes face to face time and non-face to face time preparing to see the patient (eg, review of tests), Obtaining and/or reviewing separately obtained history, Documenting clinical information in the electronic or other health record, Independently interpreting results (not separately reported) and communicating results to the patient/family/caregiver, or Care coordination (not separately reported).     Thank you for allowing me to participate in the care of Camelia Sorayaflaco Dumont.    Travis Quick MD

## 2023-12-20 LAB — ERYTHROCYTE [SEDIMENTATION RATE] IN BLOOD BY PHOTOMETRIC METHOD: 52 MM/HR (ref 0–36)

## 2024-02-12 ENCOUNTER — PATIENT MESSAGE (OUTPATIENT)
Dept: RHEUMATOLOGY | Facility: CLINIC | Age: 34
End: 2024-02-12
Payer: COMMERCIAL

## 2024-02-12 DIAGNOSIS — M05.79 RHEUMATOID ARTHRITIS INVOLVING MULTIPLE SITES WITH POSITIVE RHEUMATOID FACTOR: ICD-10-CM

## 2024-02-12 RX ORDER — METHOTREXATE 2.5 MG/1
20 TABLET ORAL
Qty: 32 TABLET | Refills: 3 | Status: SHIPPED | OUTPATIENT
Start: 2024-02-12 | End: 2024-04-24 | Stop reason: SDUPTHER

## 2024-04-17 ENCOUNTER — LAB VISIT (OUTPATIENT)
Dept: LAB | Facility: HOSPITAL | Age: 34
End: 2024-04-17
Attending: STUDENT IN AN ORGANIZED HEALTH CARE EDUCATION/TRAINING PROGRAM
Payer: COMMERCIAL

## 2024-04-17 DIAGNOSIS — M05.79 RHEUMATOID ARTHRITIS INVOLVING MULTIPLE SITES WITH POSITIVE RHEUMATOID FACTOR: ICD-10-CM

## 2024-04-17 LAB
ALBUMIN SERPL BCP-MCNC: 3.8 G/DL (ref 3.5–5.2)
ALP SERPL-CCNC: 66 U/L (ref 55–135)
ALT SERPL W/O P-5'-P-CCNC: 16 U/L (ref 10–44)
ANION GAP SERPL CALC-SCNC: 12 MMOL/L (ref 8–16)
AST SERPL-CCNC: 20 U/L (ref 10–40)
BASOPHILS # BLD AUTO: 0.02 K/UL (ref 0–0.2)
BASOPHILS NFR BLD: 0.5 % (ref 0–1.9)
BILIRUB SERPL-MCNC: 0.2 MG/DL (ref 0.1–1)
BUN SERPL-MCNC: 14 MG/DL (ref 6–20)
CALCIUM SERPL-MCNC: 9.4 MG/DL (ref 8.7–10.5)
CHLORIDE SERPL-SCNC: 105 MMOL/L (ref 95–110)
CO2 SERPL-SCNC: 21 MMOL/L (ref 23–29)
CREAT SERPL-MCNC: 1 MG/DL (ref 0.5–1.4)
CRP SERPL-MCNC: 11.8 MG/L (ref 0–8.2)
DIFFERENTIAL METHOD BLD: ABNORMAL
EOSINOPHIL # BLD AUTO: 0 K/UL (ref 0–0.5)
EOSINOPHIL NFR BLD: 0.7 % (ref 0–8)
ERYTHROCYTE [DISTWIDTH] IN BLOOD BY AUTOMATED COUNT: 13.1 % (ref 11.5–14.5)
ERYTHROCYTE [SEDIMENTATION RATE] IN BLOOD BY PHOTOMETRIC METHOD: 47 MM/HR (ref 0–36)
EST. GFR  (NO RACE VARIABLE): >60 ML/MIN/1.73 M^2
GLUCOSE SERPL-MCNC: 84 MG/DL (ref 70–110)
HCT VFR BLD AUTO: 37.8 % (ref 37–48.5)
HGB BLD-MCNC: 11.9 G/DL (ref 12–16)
IMM GRANULOCYTES # BLD AUTO: 0 K/UL (ref 0–0.04)
IMM GRANULOCYTES NFR BLD AUTO: 0 % (ref 0–0.5)
LYMPHOCYTES # BLD AUTO: 1.2 K/UL (ref 1–4.8)
LYMPHOCYTES NFR BLD: 29.7 % (ref 18–48)
MCH RBC QN AUTO: 28.7 PG (ref 27–31)
MCHC RBC AUTO-ENTMCNC: 31.5 G/DL (ref 32–36)
MCV RBC AUTO: 91 FL (ref 82–98)
MONOCYTES # BLD AUTO: 0.3 K/UL (ref 0.3–1)
MONOCYTES NFR BLD: 8.1 % (ref 4–15)
NEUTROPHILS # BLD AUTO: 2.6 K/UL (ref 1.8–7.7)
NEUTROPHILS NFR BLD: 61 % (ref 38–73)
NRBC BLD-RTO: 0 /100 WBC
PLATELET # BLD AUTO: 286 K/UL (ref 150–450)
PMV BLD AUTO: 10.6 FL (ref 9.2–12.9)
POTASSIUM SERPL-SCNC: 3.9 MMOL/L (ref 3.5–5.1)
PROT SERPL-MCNC: 8.1 G/DL (ref 6–8.4)
RBC # BLD AUTO: 4.15 M/UL (ref 4–5.4)
SODIUM SERPL-SCNC: 138 MMOL/L (ref 136–145)
WBC # BLD AUTO: 4.18 K/UL (ref 3.9–12.7)

## 2024-04-17 PROCEDURE — 85652 RBC SED RATE AUTOMATED: CPT | Performed by: STUDENT IN AN ORGANIZED HEALTH CARE EDUCATION/TRAINING PROGRAM

## 2024-04-17 PROCEDURE — 86140 C-REACTIVE PROTEIN: CPT | Performed by: STUDENT IN AN ORGANIZED HEALTH CARE EDUCATION/TRAINING PROGRAM

## 2024-04-17 PROCEDURE — 36415 COLL VENOUS BLD VENIPUNCTURE: CPT | Performed by: STUDENT IN AN ORGANIZED HEALTH CARE EDUCATION/TRAINING PROGRAM

## 2024-04-17 PROCEDURE — 85025 COMPLETE CBC W/AUTO DIFF WBC: CPT | Performed by: STUDENT IN AN ORGANIZED HEALTH CARE EDUCATION/TRAINING PROGRAM

## 2024-04-17 PROCEDURE — 80053 COMPREHEN METABOLIC PANEL: CPT | Performed by: STUDENT IN AN ORGANIZED HEALTH CARE EDUCATION/TRAINING PROGRAM

## 2024-04-19 ENCOUNTER — PATIENT MESSAGE (OUTPATIENT)
Dept: RESEARCH | Facility: HOSPITAL | Age: 34
End: 2024-04-19
Payer: COMMERCIAL

## 2024-04-24 ENCOUNTER — OFFICE VISIT (OUTPATIENT)
Dept: RHEUMATOLOGY | Facility: CLINIC | Age: 34
End: 2024-04-24
Payer: COMMERCIAL

## 2024-04-24 VITALS — BODY MASS INDEX: 24.22 KG/M2 | WEIGHT: 120.13 LBS | HEIGHT: 59 IN

## 2024-04-24 DIAGNOSIS — M05.79 RHEUMATOID ARTHRITIS INVOLVING MULTIPLE SITES WITH POSITIVE RHEUMATOID FACTOR: ICD-10-CM

## 2024-04-24 PROCEDURE — 1159F MED LIST DOCD IN RCRD: CPT | Mod: CPTII,S$GLB,, | Performed by: STUDENT IN AN ORGANIZED HEALTH CARE EDUCATION/TRAINING PROGRAM

## 2024-04-24 PROCEDURE — 1160F RVW MEDS BY RX/DR IN RCRD: CPT | Mod: CPTII,S$GLB,, | Performed by: STUDENT IN AN ORGANIZED HEALTH CARE EDUCATION/TRAINING PROGRAM

## 2024-04-24 PROCEDURE — 99215 OFFICE O/P EST HI 40 MIN: CPT | Mod: S$GLB,,, | Performed by: STUDENT IN AN ORGANIZED HEALTH CARE EDUCATION/TRAINING PROGRAM

## 2024-04-24 PROCEDURE — 99999 PR PBB SHADOW E&M-EST. PATIENT-LVL III: CPT | Mod: PBBFAC,,, | Performed by: STUDENT IN AN ORGANIZED HEALTH CARE EDUCATION/TRAINING PROGRAM

## 2024-04-24 PROCEDURE — 3008F BODY MASS INDEX DOCD: CPT | Mod: CPTII,S$GLB,, | Performed by: STUDENT IN AN ORGANIZED HEALTH CARE EDUCATION/TRAINING PROGRAM

## 2024-04-24 RX ORDER — FOLIC ACID 1 MG/1
1 TABLET ORAL DAILY
Qty: 90 TABLET | Refills: 3 | Status: SHIPPED | OUTPATIENT
Start: 2024-04-24 | End: 2025-04-24

## 2024-04-24 RX ORDER — METHOTREXATE 2.5 MG/1
20 TABLET ORAL
Qty: 96 TABLET | Refills: 1 | Status: SHIPPED | OUTPATIENT
Start: 2024-04-24

## 2024-04-26 NOTE — PROGRESS NOTES
RHEUMATOLOGY CLINIC ESTABLISHED PATIENT VISIT    Reason for consult:- seropositive rheumatoid arthritis    Chief complaints, HPI, ROS, EXAM, Assessment & Plans:-    Camelia Dumont is a 33 y.o. pleasant female who presents to follow up from her previous visit for management of seropositive rheumatoid arthritis.  She had a couple of month lapse in taking her methotrexate.  Did not notice return of pain and stiffness of multiple joints including her right wrist.  She has been back on her methotrexate for the past few weeks.  Taking ibuprofen which does help somewhat.  Methotrexate seems to be kicking back in.  Taking daily folic acid.  Rheumatologic review of systems otherwise negative.  Some possible mild synovitis of right wrist and scattered MCPs.  No dactylitis or enthesitis.    Reviewed all available old and outside pertinent medical records.    All lab results personally reviewed and interpreted by me.    1. Rheumatoid arthritis involving multiple sites with positive rheumatoid factor        Problem List Items Addressed This Visit          Immunology/Multi System    Rheumatoid arthritis involving multiple sites with positive rheumatoid factor    Relevant Medications    methotrexate 2.5 MG Tab    folic acid (FOLVITE) 1 MG tablet       Patient following up today for seropositive rheumatoid arthritis  She has been doing well on methotrexate 20 mg weekly and daily folic acid until she had a lapse in medication  Continue methotrexate 20 mg weekly and daily folic acid which she is now back on  Drug therapy requiring intensive monitoring for toxicity  High Risk Medication Monitoring encounter  No current medication related issues, no evidence of toxicity  Appropriate labs ordered for toxicity monitoring  Compromised immune system secondary to autoimmune disease and/or use of immunosuppressive drugs.  Monitor carefully for infections.  Advised patient to get immediate medical care if any infection arises.  Also advised  strict adherence age-appropriate vaccinations and cancer screenings with PCP.  Patient advised to hold DMARD and/or biologic therapy for signs of infection or for surgery. If you are unsure what to do please call our office for instruction.[facility] [phone]      # Follow up in about 4 months (around 2024).    Chronic comorbid conditions affecting medical decision making today:    No past medical history on file.    Past Surgical History:   Procedure Laterality Date    BREAST SURGERY  10/2019        Social History     Tobacco Use    Smoking status: Former     Current packs/day: 0.00     Types: Cigarettes     Start date: 2007     Quit date: 2008     Years since quittin.3    Smokeless tobacco: Never   Substance Use Topics    Alcohol use: Yes    Drug use: Never       Family History   Problem Relation Name Age of Onset    No Known Problems Mother      Hyperlipidemia Father         Review of patient's allergies indicates:  No Known Allergies    Medication List with Changes/Refills   Current Medications    JUNEL FE 1/20, 28, 1 MG-20 MCG (21)/75 MG (7) PER TABLET    Take 1 tablet by mouth once daily.   Changed and/or Refilled Medications    Modified Medication Previous Medication    FOLIC ACID (FOLVITE) 1 MG TABLET folic acid (FOLVITE) 1 MG tablet       Take 1 tablet (1 mg total) by mouth once daily.    Take 1 tablet (1 mg total) by mouth once daily.    METHOTREXATE 2.5 MG TAB methotrexate 2.5 MG Tab       Take 8 tablets (20 mg total) by mouth every 7 days.    Take 8 tablets (20 mg total) by mouth every 7 days.         Disclaimer: This note was prepared using voice recognition system and is likely to have sound alike errors and is not proofread.  Please message me with any questions.    32 minutes of total time spent on the encounter, which includes face to face time and non-face to face time preparing to see the patient (eg, review of tests), Obtaining and/or reviewing separately  obtained history, Documenting clinical information in the electronic or other health record, Independently interpreting results (not separately reported) and communicating results to the patient/family/caregiver, or Care coordination (not separately reported).     Thank you for allowing me to participate in the care of Camelia Dumnot.    Travis Quick MD

## 2024-08-20 ENCOUNTER — PATIENT MESSAGE (OUTPATIENT)
Dept: RHEUMATOLOGY | Facility: CLINIC | Age: 34
End: 2024-08-20
Payer: COMMERCIAL

## 2024-08-20 DIAGNOSIS — M05.79 RHEUMATOID ARTHRITIS INVOLVING MULTIPLE SITES WITH POSITIVE RHEUMATOID FACTOR: ICD-10-CM

## 2024-08-21 ENCOUNTER — LAB VISIT (OUTPATIENT)
Dept: LAB | Facility: HOSPITAL | Age: 34
End: 2024-08-21
Attending: STUDENT IN AN ORGANIZED HEALTH CARE EDUCATION/TRAINING PROGRAM
Payer: COMMERCIAL

## 2024-08-21 DIAGNOSIS — M05.79 RHEUMATOID ARTHRITIS INVOLVING MULTIPLE SITES WITH POSITIVE RHEUMATOID FACTOR: ICD-10-CM

## 2024-08-21 LAB
ALBUMIN SERPL BCP-MCNC: 3.7 G/DL (ref 3.5–5.2)
ALP SERPL-CCNC: 69 U/L (ref 55–135)
ALT SERPL W/O P-5'-P-CCNC: 9 U/L (ref 10–44)
ANION GAP SERPL CALC-SCNC: 8 MMOL/L (ref 8–16)
AST SERPL-CCNC: 20 U/L (ref 10–40)
BASOPHILS # BLD AUTO: 0.01 K/UL (ref 0–0.2)
BASOPHILS NFR BLD: 0.3 % (ref 0–1.9)
BILIRUB SERPL-MCNC: 0.2 MG/DL (ref 0.1–1)
BUN SERPL-MCNC: 12 MG/DL (ref 6–20)
CALCIUM SERPL-MCNC: 9.2 MG/DL (ref 8.7–10.5)
CHLORIDE SERPL-SCNC: 106 MMOL/L (ref 95–110)
CO2 SERPL-SCNC: 23 MMOL/L (ref 23–29)
CREAT SERPL-MCNC: 0.8 MG/DL (ref 0.5–1.4)
CRP SERPL-MCNC: 10.4 MG/L (ref 0–8.2)
DIFFERENTIAL METHOD BLD: ABNORMAL
EOSINOPHIL # BLD AUTO: 0 K/UL (ref 0–0.5)
EOSINOPHIL NFR BLD: 0.5 % (ref 0–8)
ERYTHROCYTE [DISTWIDTH] IN BLOOD BY AUTOMATED COUNT: 14.3 % (ref 11.5–14.5)
ERYTHROCYTE [SEDIMENTATION RATE] IN BLOOD BY PHOTOMETRIC METHOD: 35 MM/HR (ref 0–36)
EST. GFR  (NO RACE VARIABLE): >60 ML/MIN/1.73 M^2
GLUCOSE SERPL-MCNC: 79 MG/DL (ref 70–110)
HCT VFR BLD AUTO: 38.5 % (ref 37–48.5)
HGB BLD-MCNC: 11.8 G/DL (ref 12–16)
IMM GRANULOCYTES # BLD AUTO: 0.02 K/UL (ref 0–0.04)
IMM GRANULOCYTES NFR BLD AUTO: 0.5 % (ref 0–0.5)
LYMPHOCYTES # BLD AUTO: 1.1 K/UL (ref 1–4.8)
LYMPHOCYTES NFR BLD: 28.4 % (ref 18–48)
MCH RBC QN AUTO: 28.9 PG (ref 27–31)
MCHC RBC AUTO-ENTMCNC: 30.6 G/DL (ref 32–36)
MCV RBC AUTO: 94 FL (ref 82–98)
MONOCYTES # BLD AUTO: 0.4 K/UL (ref 0.3–1)
MONOCYTES NFR BLD: 9.7 % (ref 4–15)
NEUTROPHILS # BLD AUTO: 2.4 K/UL (ref 1.8–7.7)
NEUTROPHILS NFR BLD: 60.6 % (ref 38–73)
NRBC BLD-RTO: 0 /100 WBC
PLATELET # BLD AUTO: 260 K/UL (ref 150–450)
PMV BLD AUTO: 11 FL (ref 9.2–12.9)
POTASSIUM SERPL-SCNC: 4.4 MMOL/L (ref 3.5–5.1)
PROT SERPL-MCNC: 7.6 G/DL (ref 6–8.4)
RBC # BLD AUTO: 4.08 M/UL (ref 4–5.4)
SODIUM SERPL-SCNC: 137 MMOL/L (ref 136–145)
WBC # BLD AUTO: 3.91 K/UL (ref 3.9–12.7)

## 2024-08-21 PROCEDURE — 85025 COMPLETE CBC W/AUTO DIFF WBC: CPT | Performed by: STUDENT IN AN ORGANIZED HEALTH CARE EDUCATION/TRAINING PROGRAM

## 2024-08-21 PROCEDURE — 85652 RBC SED RATE AUTOMATED: CPT | Performed by: STUDENT IN AN ORGANIZED HEALTH CARE EDUCATION/TRAINING PROGRAM

## 2024-08-21 PROCEDURE — 36415 COLL VENOUS BLD VENIPUNCTURE: CPT | Performed by: STUDENT IN AN ORGANIZED HEALTH CARE EDUCATION/TRAINING PROGRAM

## 2024-08-21 PROCEDURE — 80053 COMPREHEN METABOLIC PANEL: CPT | Performed by: STUDENT IN AN ORGANIZED HEALTH CARE EDUCATION/TRAINING PROGRAM

## 2024-08-21 PROCEDURE — 86140 C-REACTIVE PROTEIN: CPT | Performed by: STUDENT IN AN ORGANIZED HEALTH CARE EDUCATION/TRAINING PROGRAM

## 2024-08-22 RX ORDER — METHOTREXATE 2.5 MG/1
20 TABLET ORAL
Qty: 96 TABLET | Refills: 1 | Status: SHIPPED | OUTPATIENT
Start: 2024-08-22

## 2024-08-22 RX ORDER — FOLIC ACID 1 MG/1
1 TABLET ORAL DAILY
Qty: 90 TABLET | Refills: 3 | Status: SHIPPED | OUTPATIENT
Start: 2024-08-22 | End: 2025-08-22

## 2024-10-07 ENCOUNTER — OFFICE VISIT (OUTPATIENT)
Dept: RHEUMATOLOGY | Facility: CLINIC | Age: 34
End: 2024-10-07
Payer: COMMERCIAL

## 2024-10-07 DIAGNOSIS — Z79.899 IMMUNODEFICIENCY DUE TO DRUG THERAPY: ICD-10-CM

## 2024-10-07 DIAGNOSIS — Z79.899 HIGH RISK MEDICATION USE: ICD-10-CM

## 2024-10-07 DIAGNOSIS — Z51.81 ENCOUNTER FOR MONITORING IMMUNOSUPPRESSIVE MEDICATION THERAPY CAUSING IMMUNODEFICIENCY: ICD-10-CM

## 2024-10-07 DIAGNOSIS — D84.821 ENCOUNTER FOR MONITORING IMMUNOSUPPRESSIVE MEDICATION THERAPY CAUSING IMMUNODEFICIENCY: ICD-10-CM

## 2024-10-07 DIAGNOSIS — R76.8 POSITIVE ANA (ANTINUCLEAR ANTIBODY): ICD-10-CM

## 2024-10-07 DIAGNOSIS — Z79.60 ENCOUNTER FOR MONITORING IMMUNOSUPPRESSIVE MEDICATION THERAPY CAUSING IMMUNODEFICIENCY: ICD-10-CM

## 2024-10-07 DIAGNOSIS — M05.79 RHEUMATOID ARTHRITIS INVOLVING MULTIPLE SITES WITH POSITIVE RHEUMATOID FACTOR: Primary | ICD-10-CM

## 2024-10-07 DIAGNOSIS — D84.821 IMMUNODEFICIENCY DUE TO DRUG THERAPY: ICD-10-CM

## 2024-10-07 PROCEDURE — 1160F RVW MEDS BY RX/DR IN RCRD: CPT | Mod: CPTII,95,, | Performed by: STUDENT IN AN ORGANIZED HEALTH CARE EDUCATION/TRAINING PROGRAM

## 2024-10-07 PROCEDURE — 99215 OFFICE O/P EST HI 40 MIN: CPT | Mod: 95,,, | Performed by: STUDENT IN AN ORGANIZED HEALTH CARE EDUCATION/TRAINING PROGRAM

## 2024-10-07 PROCEDURE — 1159F MED LIST DOCD IN RCRD: CPT | Mod: CPTII,95,, | Performed by: STUDENT IN AN ORGANIZED HEALTH CARE EDUCATION/TRAINING PROGRAM

## 2024-10-07 NOTE — PROGRESS NOTES
The patient location is: [place]  The chief complaint leading to consultation is: RA    Visit type: audiovisual    Face to Face time with patient: 10 minutes  30 minutes of total time spent on the encounter, which includes face to face time and non-face to face time preparing to see the patient (eg, review of tests), Obtaining and/or reviewing separately obtained history, Documenting clinical information in the electronic or other health record, Independently interpreting results (not separately reported) and communicating results to the patient/family/caregiver, or Care coordination (not separately reported).         Each patient to whom he or she provides medical services by telemedicine is:  (1) informed of the relationship between the physician and patient and the respective role of any other health care provider with respect to management of the patient; and (2) notified that he or she may decline to receive medical services by telemedicine and may withdraw from such care at any time.    Notes:       Subjective:      Patient ID: Camelia Dumont is a 34 y.o. female.    Chief Complaint: Rheumatoid arthritis    HPI:   Patient presents for follow up of seropositive RA. She is on MTX 20 mg/wk in split dosing + FA 1 mg daily. She is doing well on this regimen. Left 3rd finger PIP was swollen and tender a few days ago. Currently and for the most part, no swollen and tender joints. Morning stiffness 5 minutes. Prior to MTX, morning stiffness was still mild at a few minutes but well under an hour. She might have had a gum ulceration recently but thinks this is due to poor dental health and needs to schedule a follow up with her Dentist. Denies joint swelling, significant stiffness, skin rashes, oral ulcers, patchy alopecia, sicca symptoms, cardiopulmonary symptoms, eye inflammation, IBD, fevers, weight loss, Raynaud's. Rheumatology review of systems is otherwise negative.    She was diagnosed with RA in 2022. Started  with left shoulder severe pain then multiple joint pain and mild synovitis per the notes. Found to have high titer EDI+ with SSA+ as well as RF+ and CCP+. Sed rate and CRP were elevated. Her symptoms and inflammatory markers have improved with MTX monotherapy.    Family Hx: No family history of autoimmune disease      Objective:   There were no vitals taken for this visit.  Physical Exam  Constitutional:       General: She is not in acute distress.     Appearance: Normal appearance.   Musculoskeletal:      Comments: No swollen joints in the hands. Can make 100% fists.             Assessment and Plan:     Problem List Items Addressed This Visit          Unprioritized    Rheumatoid arthritis involving multiple sites with positive rheumatoid factor - Primary    Relevant Orders    EDI Screen w/Reflex    Sjogrens syndrome-A extractable nuclear antibody    C3 Complement    C4 Complement    Protein/Creatinine Ratio, Urine    Urinalysis    Sedimentation rate    C-Reactive Protein    CBC Auto Differential    Comprehensive Metabolic Panel    Hepatitis B Core Antibody, Total    Hepatitis B Surface Antigen    Quantiferon Gold TB     Other Visit Diagnoses       Immunodeficiency due to drug therapy        Relevant Orders    EDI Screen w/Reflex    Sjogrens syndrome-A extractable nuclear antibody    C3 Complement    C4 Complement    Protein/Creatinine Ratio, Urine    Urinalysis    Sedimentation rate    C-Reactive Protein    CBC Auto Differential    Comprehensive Metabolic Panel    Hepatitis B Core Antibody, Total    Hepatitis B Surface Antigen    Quantiferon Gold TB    High risk medication use        Relevant Orders    EDI Screen w/Reflex    Sjogrens syndrome-A extractable nuclear antibody    C3 Complement    C4 Complement    Protein/Creatinine Ratio, Urine    Urinalysis    Sedimentation rate    C-Reactive Protein    CBC Auto Differential    Comprehensive Metabolic Panel    Hepatitis B Core Antibody, Total    Hepatitis B Surface  Antigen    Quantiferon Gold TB    Encounter for monitoring immunosuppressive medication therapy causing immunodeficiency        Relevant Orders    EDI Screen w/Reflex    Sjogrens syndrome-A extractable nuclear antibody    C3 Complement    C4 Complement    Protein/Creatinine Ratio, Urine    Urinalysis    Sedimentation rate    C-Reactive Protein    CBC Auto Differential    Comprehensive Metabolic Panel    Hepatitis B Core Antibody, Total    Hepatitis B Surface Antigen    Quantiferon Gold TB    Positive EDI (antinuclear antibody)        Relevant Orders    EDI Screen w/Reflex    Sjogrens syndrome-A extractable nuclear antibody    C3 Complement    C4 Complement    Protein/Creatinine Ratio, Urine    Urinalysis    Sedimentation rate    C-Reactive Protein    CBC Auto Differential    Comprehensive Metabolic Panel    Hepatitis B Core Antibody, Total    Hepatitis B Surface Antigen    Quantiferon Gold TB           Latest Reference Range & Units 08/21/24 11:09   WBC 3.90 - 12.70 K/uL 3.91   RBC 4.00 - 5.40 M/uL 4.08   Hemoglobin 12.0 - 16.0 g/dL 11.8 (L)   Hematocrit 37.0 - 48.5 % 38.5   MCV 82 - 98 fL 94   MCH 27.0 - 31.0 pg 28.9   MCHC 32.0 - 36.0 g/dL 30.6 (L)   RDW 11.5 - 14.5 % 14.3   Platelet Count 150 - 450 K/uL 260   MPV 9.2 - 12.9 fL 11.0   Gran % 38.0 - 73.0 % 60.6   Lymph % 18.0 - 48.0 % 28.4   Mono % 4.0 - 15.0 % 9.7   Eos % 0.0 - 8.0 % 0.5   Basophil % 0.0 - 1.9 % 0.3   Immature Granulocytes 0.0 - 0.5 % 0.5   Gran # (ANC) 1.8 - 7.7 K/uL 2.4   Lymph # 1.0 - 4.8 K/uL 1.1   Mono # 0.3 - 1.0 K/uL 0.4   Eos # 0.0 - 0.5 K/uL 0.0   Baso # 0.00 - 0.20 K/uL 0.01   Immature Grans (Abs) 0.00 - 0.04 K/uL 0.02   nRBC 0 /100 WBC 0   Differential Method  Automated   Sed Rate 0 - 36 mm/Hr 35   Sodium 136 - 145 mmol/L 137   Potassium 3.5 - 5.1 mmol/L 4.4   Chloride 95 - 110 mmol/L 106   CO2 23 - 29 mmol/L 23   Anion Gap 8 - 16 mmol/L 8   BUN 6 - 20 mg/dL 12   Creatinine 0.5 - 1.4 mg/dL 0.8   eGFR >60 mL/min/1.73 m^2 >60.0   Glucose  70 - 110 mg/dL 79   Calcium 8.7 - 10.5 mg/dL 9.2   ALP 55 - 135 U/L 69   PROTEIN TOTAL 6.0 - 8.4 g/dL 7.6   Albumin 3.5 - 5.2 g/dL 3.7   BILIRUBIN TOTAL 0.1 - 1.0 mg/dL 0.2   AST 10 - 40 U/L 20   ALT 10 - 44 U/L 9 (L)   CRP 0.0 - 8.2 mg/L 10.4 (H)   (L): Data is abnormally low  (H): Data is abnormally high    Patient presents for follow up of seropositive RA.    EDI+ 1:1280 speckled.  SSA+  Remainder of DEI profile is negative.    RF+ 22  CCP+ 65    I reviewed labs for this visit as above. No concerns.  I reviewed her hand XR images - joint space narrowing in PIPs. No erosions.    Disease is limited to rheumatoid arthritis at this time. No manifestations of lupus, Sjogren's disease, or other related CTD.    Plan:  Continue MTX 20 mg/wk in split dosing.  Continue folic acid 1 mg daily.  Safety and disease monitoring labs in 3 months.      High Risk Medication Monitoring encounter  Drug therapy requiring intensive monitoring for toxicity  No current medication related issues, no evidence of toxicity  Appropriate labs ordered for toxicity monitoring    Compromised immune system secondary to autoimmune disease and/or use of immunosuppressive drugs.  Monitor carefully for infections.  Advised patient to get immediate medical care if any infection arises.  Also advised strict adherence age-appropriate vaccinations and cancer screenings with PCP.    Patient advised to hold DMARD and/or biologic therapy for signs of infection or for surgery. If you are unsure what to do please call our office for instruction.[facility] [phone]            Follow up in about 3 months (around 1/7/2025).

## 2024-10-09 ENCOUNTER — PATIENT MESSAGE (OUTPATIENT)
Dept: RHEUMATOLOGY | Facility: CLINIC | Age: 34
End: 2024-10-09
Payer: COMMERCIAL

## 2025-01-30 ENCOUNTER — PATIENT MESSAGE (OUTPATIENT)
Dept: RHEUMATOLOGY | Facility: CLINIC | Age: 35
End: 2025-01-30
Payer: COMMERCIAL

## 2025-01-30 DIAGNOSIS — M05.79 RHEUMATOID ARTHRITIS INVOLVING MULTIPLE SITES WITH POSITIVE RHEUMATOID FACTOR: ICD-10-CM

## 2025-01-31 RX ORDER — FOLIC ACID 1 MG/1
1 TABLET ORAL DAILY
Qty: 90 TABLET | Refills: 3 | Status: SHIPPED | OUTPATIENT
Start: 2025-01-31 | End: 2026-01-31

## 2025-02-03 ENCOUNTER — LAB VISIT (OUTPATIENT)
Dept: LAB | Facility: HOSPITAL | Age: 35
End: 2025-02-03
Attending: STUDENT IN AN ORGANIZED HEALTH CARE EDUCATION/TRAINING PROGRAM
Payer: COMMERCIAL

## 2025-02-03 DIAGNOSIS — Z51.81 ENCOUNTER FOR MONITORING IMMUNOSUPPRESSIVE MEDICATION THERAPY CAUSING IMMUNODEFICIENCY: ICD-10-CM

## 2025-02-03 DIAGNOSIS — Z79.60 ENCOUNTER FOR MONITORING IMMUNOSUPPRESSIVE MEDICATION THERAPY CAUSING IMMUNODEFICIENCY: ICD-10-CM

## 2025-02-03 DIAGNOSIS — M05.79 RHEUMATOID ARTHRITIS INVOLVING MULTIPLE SITES WITH POSITIVE RHEUMATOID FACTOR: ICD-10-CM

## 2025-02-03 DIAGNOSIS — Z79.899 HIGH RISK MEDICATION USE: ICD-10-CM

## 2025-02-03 DIAGNOSIS — R76.8 POSITIVE ANA (ANTINUCLEAR ANTIBODY): ICD-10-CM

## 2025-02-03 DIAGNOSIS — D84.821 ENCOUNTER FOR MONITORING IMMUNOSUPPRESSIVE MEDICATION THERAPY CAUSING IMMUNODEFICIENCY: ICD-10-CM

## 2025-02-03 DIAGNOSIS — Z79.899 IMMUNODEFICIENCY DUE TO DRUG THERAPY: ICD-10-CM

## 2025-02-03 DIAGNOSIS — D84.821 IMMUNODEFICIENCY DUE TO DRUG THERAPY: ICD-10-CM

## 2025-02-03 LAB
ALBUMIN SERPL BCP-MCNC: 3.9 G/DL (ref 3.5–5.2)
ALP SERPL-CCNC: 75 U/L (ref 40–150)
ALT SERPL W/O P-5'-P-CCNC: 15 U/L (ref 10–44)
ANION GAP SERPL CALC-SCNC: 9 MMOL/L (ref 8–16)
AST SERPL-CCNC: 20 U/L (ref 10–40)
BACTERIA #/AREA URNS HPF: ABNORMAL /HPF
BASOPHILS # BLD AUTO: 0.01 K/UL (ref 0–0.2)
BASOPHILS NFR BLD: 0.2 % (ref 0–1.9)
BILIRUB SERPL-MCNC: 0.2 MG/DL (ref 0.1–1)
BILIRUB UR QL STRIP: NEGATIVE
BUN SERPL-MCNC: 10 MG/DL (ref 6–20)
C3 SERPL-MCNC: 157 MG/DL (ref 50–180)
C4 SERPL-MCNC: 34 MG/DL (ref 11–44)
CALCIUM SERPL-MCNC: 9.2 MG/DL (ref 8.7–10.5)
CHLORIDE SERPL-SCNC: 107 MMOL/L (ref 95–110)
CLARITY UR: CLEAR
CO2 SERPL-SCNC: 23 MMOL/L (ref 23–29)
COLOR UR: YELLOW
CREAT SERPL-MCNC: 0.8 MG/DL (ref 0.5–1.4)
CREAT UR-MCNC: 176 MG/DL (ref 15–325)
CRP SERPL-MCNC: 9.6 MG/L (ref 0–8.2)
DIFFERENTIAL METHOD BLD: ABNORMAL
EOSINOPHIL # BLD AUTO: 0.1 K/UL (ref 0–0.5)
EOSINOPHIL NFR BLD: 1.3 % (ref 0–8)
ERYTHROCYTE [DISTWIDTH] IN BLOOD BY AUTOMATED COUNT: 14.8 % (ref 11.5–14.5)
ERYTHROCYTE [SEDIMENTATION RATE] IN BLOOD BY PHOTOMETRIC METHOD: 60 MM/HR (ref 0–36)
EST. GFR  (NO RACE VARIABLE): >60 ML/MIN/1.73 M^2
GLUCOSE SERPL-MCNC: 69 MG/DL (ref 70–110)
GLUCOSE UR QL STRIP: NEGATIVE
HBV CORE AB SERPL QL IA: NORMAL
HBV SURFACE AG SERPL QL IA: NORMAL
HCT VFR BLD AUTO: 38.4 % (ref 37–48.5)
HGB BLD-MCNC: 12.1 G/DL (ref 12–16)
HGB UR QL STRIP: NEGATIVE
IMM GRANULOCYTES # BLD AUTO: 0.02 K/UL (ref 0–0.04)
IMM GRANULOCYTES NFR BLD AUTO: 0.4 % (ref 0–0.5)
KETONES UR QL STRIP: NEGATIVE
LEUKOCYTE ESTERASE UR QL STRIP: ABNORMAL
LYMPHOCYTES # BLD AUTO: 1.4 K/UL (ref 1–4.8)
LYMPHOCYTES NFR BLD: 30.8 % (ref 18–48)
MCH RBC QN AUTO: 29.7 PG (ref 27–31)
MCHC RBC AUTO-ENTMCNC: 31.5 G/DL (ref 32–36)
MCV RBC AUTO: 94 FL (ref 82–98)
MICROSCOPIC COMMENT: ABNORMAL
MONOCYTES # BLD AUTO: 0.5 K/UL (ref 0.3–1)
MONOCYTES NFR BLD: 10.1 % (ref 4–15)
NEUTROPHILS # BLD AUTO: 2.5 K/UL (ref 1.8–7.7)
NEUTROPHILS NFR BLD: 57.2 % (ref 38–73)
NITRITE UR QL STRIP: NEGATIVE
NRBC BLD-RTO: 0 /100 WBC
PH UR STRIP: 7 [PH] (ref 5–8)
PLATELET # BLD AUTO: 270 K/UL (ref 150–450)
PMV BLD AUTO: 10.7 FL (ref 9.2–12.9)
POTASSIUM SERPL-SCNC: 4.1 MMOL/L (ref 3.5–5.1)
PROT SERPL-MCNC: 8.3 G/DL (ref 6–8.4)
PROT UR QL STRIP: NEGATIVE
PROT UR-MCNC: 15 MG/DL (ref 0–15)
PROT/CREAT UR: 0.09 MG/G{CREAT} (ref 0–0.2)
RBC # BLD AUTO: 4.08 M/UL (ref 4–5.4)
RBC #/AREA URNS HPF: 3 /HPF (ref 0–4)
SODIUM SERPL-SCNC: 139 MMOL/L (ref 136–145)
SP GR UR STRIP: 1.02 (ref 1–1.03)
URN SPEC COLLECT METH UR: ABNORMAL
WBC # BLD AUTO: 4.45 K/UL (ref 3.9–12.7)
WBC #/AREA URNS HPF: 17 /HPF (ref 0–5)

## 2025-02-03 PROCEDURE — 36415 COLL VENOUS BLD VENIPUNCTURE: CPT | Performed by: STUDENT IN AN ORGANIZED HEALTH CARE EDUCATION/TRAINING PROGRAM

## 2025-02-03 PROCEDURE — 87340 HEPATITIS B SURFACE AG IA: CPT | Performed by: STUDENT IN AN ORGANIZED HEALTH CARE EDUCATION/TRAINING PROGRAM

## 2025-02-03 PROCEDURE — 86039 ANTINUCLEAR ANTIBODIES (ANA): CPT | Performed by: STUDENT IN AN ORGANIZED HEALTH CARE EDUCATION/TRAINING PROGRAM

## 2025-02-03 PROCEDURE — 86704 HEP B CORE ANTIBODY TOTAL: CPT | Performed by: STUDENT IN AN ORGANIZED HEALTH CARE EDUCATION/TRAINING PROGRAM

## 2025-02-03 PROCEDURE — 85025 COMPLETE CBC W/AUTO DIFF WBC: CPT | Performed by: STUDENT IN AN ORGANIZED HEALTH CARE EDUCATION/TRAINING PROGRAM

## 2025-02-03 PROCEDURE — 86480 TB TEST CELL IMMUN MEASURE: CPT | Performed by: STUDENT IN AN ORGANIZED HEALTH CARE EDUCATION/TRAINING PROGRAM

## 2025-02-03 PROCEDURE — 85652 RBC SED RATE AUTOMATED: CPT | Performed by: STUDENT IN AN ORGANIZED HEALTH CARE EDUCATION/TRAINING PROGRAM

## 2025-02-03 PROCEDURE — 86160 COMPLEMENT ANTIGEN: CPT | Performed by: STUDENT IN AN ORGANIZED HEALTH CARE EDUCATION/TRAINING PROGRAM

## 2025-02-03 PROCEDURE — 81000 URINALYSIS NONAUTO W/SCOPE: CPT | Performed by: STUDENT IN AN ORGANIZED HEALTH CARE EDUCATION/TRAINING PROGRAM

## 2025-02-03 PROCEDURE — 86038 ANTINUCLEAR ANTIBODIES: CPT | Performed by: STUDENT IN AN ORGANIZED HEALTH CARE EDUCATION/TRAINING PROGRAM

## 2025-02-03 PROCEDURE — 86160 COMPLEMENT ANTIGEN: CPT | Mod: 59 | Performed by: STUDENT IN AN ORGANIZED HEALTH CARE EDUCATION/TRAINING PROGRAM

## 2025-02-03 PROCEDURE — 82570 ASSAY OF URINE CREATININE: CPT | Performed by: STUDENT IN AN ORGANIZED HEALTH CARE EDUCATION/TRAINING PROGRAM

## 2025-02-03 PROCEDURE — 86225 DNA ANTIBODY NATIVE: CPT | Performed by: STUDENT IN AN ORGANIZED HEALTH CARE EDUCATION/TRAINING PROGRAM

## 2025-02-03 PROCEDURE — 86235 NUCLEAR ANTIGEN ANTIBODY: CPT | Performed by: STUDENT IN AN ORGANIZED HEALTH CARE EDUCATION/TRAINING PROGRAM

## 2025-02-03 PROCEDURE — 80053 COMPREHEN METABOLIC PANEL: CPT | Performed by: STUDENT IN AN ORGANIZED HEALTH CARE EDUCATION/TRAINING PROGRAM

## 2025-02-03 PROCEDURE — 86140 C-REACTIVE PROTEIN: CPT | Performed by: STUDENT IN AN ORGANIZED HEALTH CARE EDUCATION/TRAINING PROGRAM

## 2025-02-04 LAB
GAMMA INTERFERON BACKGROUND BLD IA-ACNC: 0.01 IU/ML
M TB IFN-G CD4+ BCKGRND COR BLD-ACNC: 0.01 IU/ML
M TB IFN-G CD4+ BCKGRND COR BLD-ACNC: 0.01 IU/ML
MITOGEN IGNF BCKGRD COR BLD-ACNC: 4.77 IU/ML
TB GOLD PLUS: NEGATIVE

## 2025-02-06 LAB
ANA PATTERN 1: NORMAL
ANA SER QL IF: POSITIVE
ANA TITR SER IF: NORMAL {TITER}
ANTI-SSA ANTIBODY: 4.76 RATIO (ref 0–0.99)
ANTI-SSA INTERPRETATION: POSITIVE

## 2025-02-08 LAB
ANTI SM ANTIBODY: 0.11 RATIO (ref 0–0.99)
ANTI SM/RNP ANTIBODY: 0.26 RATIO (ref 0–0.99)
ANTI-SM INTERPRETATION: NEGATIVE
ANTI-SM/RNP INTERPRETATION: NEGATIVE
ANTI-SSA ANTIBODY: 4.76 RATIO (ref 0–0.99)
ANTI-SSA INTERPRETATION: POSITIVE
ANTI-SSB ANTIBODY: 0.43 RATIO (ref 0–0.99)
ANTI-SSB INTERPRETATION: NEGATIVE
DSDNA AB SER-ACNC: ABNORMAL [IU]/ML

## 2025-02-12 ENCOUNTER — PATIENT MESSAGE (OUTPATIENT)
Dept: RHEUMATOLOGY | Facility: CLINIC | Age: 35
End: 2025-02-12
Payer: COMMERCIAL

## 2025-02-17 ENCOUNTER — OFFICE VISIT (OUTPATIENT)
Dept: RHEUMATOLOGY | Facility: CLINIC | Age: 35
End: 2025-02-17
Payer: COMMERCIAL

## 2025-02-17 DIAGNOSIS — Z79.899 HIGH RISK MEDICATION USE: ICD-10-CM

## 2025-02-17 DIAGNOSIS — M05.79 RHEUMATOID ARTHRITIS INVOLVING MULTIPLE SITES WITH POSITIVE RHEUMATOID FACTOR: Primary | ICD-10-CM

## 2025-02-17 DIAGNOSIS — Z79.899 IMMUNODEFICIENCY DUE TO DRUG THERAPY: ICD-10-CM

## 2025-02-17 DIAGNOSIS — Z51.81 ENCOUNTER FOR MONITORING IMMUNOSUPPRESSIVE MEDICATION THERAPY CAUSING IMMUNODEFICIENCY: ICD-10-CM

## 2025-02-17 DIAGNOSIS — Z79.60 ENCOUNTER FOR MONITORING IMMUNOSUPPRESSIVE MEDICATION THERAPY CAUSING IMMUNODEFICIENCY: ICD-10-CM

## 2025-02-17 DIAGNOSIS — D84.821 ENCOUNTER FOR MONITORING IMMUNOSUPPRESSIVE MEDICATION THERAPY CAUSING IMMUNODEFICIENCY: ICD-10-CM

## 2025-02-17 DIAGNOSIS — R76.8 POSITIVE ANA (ANTINUCLEAR ANTIBODY): ICD-10-CM

## 2025-02-17 DIAGNOSIS — D84.821 IMMUNODEFICIENCY DUE TO DRUG THERAPY: ICD-10-CM

## 2025-02-17 NOTE — PROGRESS NOTES
The patient location is: [place]  The chief complaint leading to consultation is: RA    Visit type: audiovisual    Face to Face time with patient: 16 minutes  30 minutes of total time spent on the encounter, which includes face to face time and non-face to face time preparing to see the patient (eg, review of tests), Obtaining and/or reviewing separately obtained history, Documenting clinical information in the electronic or other health record, Independently interpreting results (not separately reported) and communicating results to the patient/family/caregiver, or Care coordination (not separately reported).         Each patient to whom he or she provides medical services by telemedicine is:  (1) informed of the relationship between the physician and patient and the respective role of any other health care provider with respect to management of the patient; and (2) notified that he or she may decline to receive medical services by telemedicine and may withdraw from such care at any time.    Notes:       Subjective:      Patient ID: Camelia Dumont is a 34 y.o. female.    Chief Complaint: Rheumatoid arthritis    HPI:   Patient presents for follow up of seropositive RA. She takes MTX 20 mg/wk in split dosing + FA 1 mg daily. She is doing well. Morning stiffness 5 minutes. Reports dysuria when labs were done but this resolved with drinking a lot of water and she feels well today. She did not realize she should hold DMARD for infection. Reports that July 2024 she had severe right sided facial pain behind the ear which was exacerbated by chewing food. Pain resolved by next day. A few days ago she felt some tingling also behind the right ear but this quickly resolved. She is worried this might represent Sjogren's disease.     Denies joint pain, joint swelling, significant stiffness, skin rashes, oral ulcers, patchy alopecia, sicca symptoms, cardiopulmonary symptoms, eye inflammation, IBD, fevers, weight loss,  Raynaud's. Rheumatology review of systems is otherwise negative.    She was diagnosed with RA in 2022. Started with left shoulder severe pain then multiple joint pain and mild synovitis per the notes. Found to have high titer EDI+ with SSA+ as well as RF+ and CCP+. Sed rate and CRP were elevated. Her symptoms and inflammatory markers have improved with MTX monotherapy.    Family Hx: No family history of autoimmune disease      Objective:   There were no vitals taken for this visit.  Physical Exam  Constitutional:       General: She is not in acute distress.     Appearance: Normal appearance.   Musculoskeletal:      Comments: No swollen joints in the hands. Can make 100% fists.             Assessment and Plan:     Problem List Items Addressed This Visit          Unprioritized    Rheumatoid arthritis involving multiple sites with positive rheumatoid factor - Primary    Relevant Orders    C3 Complement    C4 Complement    CBC Auto Differential    Comprehensive Metabolic Panel    C-Reactive Protein    Cryoglobulin    Protein/Creatinine Ratio, Urine    Sedimentation rate    Urinalysis     Other Visit Diagnoses         Positive EDI (antinuclear antibody)        Relevant Orders    C3 Complement    C4 Complement    CBC Auto Differential    Comprehensive Metabolic Panel    C-Reactive Protein    Cryoglobulin    Protein/Creatinine Ratio, Urine    Sedimentation rate    Urinalysis      High risk medication use          Encounter for monitoring immunosuppressive medication therapy causing immunodeficiency          Immunodeficiency due to drug therapy                Patient presents for follow up of seropositive RA.    EDI+ 1:1280 speckled.  SSA+  Remainder of EDI profile is negative.    RF+ 22  CCP+ 65    I reviewed her hand XR images - joint space narrowing in PIPs. No erosions.    Disease is limited to rheumatoid arthritis at this time. No manifestations of lupus, Sjogren's disease, or other related CTD. Symptoms c/w right  sided sialadenitis in July 2024 but no sicca sxs to concern for SjS. Doingwell on current RA regimen. Elev ESR and CRP with recent labs likely 2/2 UTI.     Plan:  Continue MTX 20 mg/wk in split dosing.  Continue folic acid 1 mg daily.  Positive EDI. Monitor. Labs ordered.  Safety and disease monitoring labs in 3 months.      High Risk Medication Monitoring encounter  Drug therapy requiring intensive monitoring for toxicity  No current medication related issues, no evidence of toxicity  Appropriate labs ordered for toxicity monitoring    Compromised immune system secondary to autoimmune disease and/or use of immunosuppressive drugs.  Monitor carefully for infections.  Advised patient to get immediate medical care if any infection arises.  Also advised strict adherence age-appropriate vaccinations and cancer screenings with PCP.    Patient advised to hold DMARD and/or biologic therapy for signs of infection or for surgery. If you are unsure what to do please call our office for instruction.[facility] [phone]        Follow up scheduled for June 2025.      Today's visit is associated with current or anticipated ongoing medical care related to this patient's diagnosis of rheumatoid arthritis, which is a chronic disease which will require regular follow up to manage symptoms and progression. The patient is to return to the office within a minimum of 3-6 months to review symptoms and function at that time. CPT code

## 2025-03-13 ENCOUNTER — PATIENT MESSAGE (OUTPATIENT)
Dept: RHEUMATOLOGY | Facility: CLINIC | Age: 35
End: 2025-03-13
Payer: COMMERCIAL

## 2025-03-23 DIAGNOSIS — M05.79 RHEUMATOID ARTHRITIS INVOLVING MULTIPLE SITES WITH POSITIVE RHEUMATOID FACTOR: ICD-10-CM

## 2025-03-24 RX ORDER — METHOTREXATE 2.5 MG/1
20 TABLET ORAL WEEKLY
Qty: 96 TABLET | Refills: 0 | Status: SHIPPED | OUTPATIENT
Start: 2025-03-24

## 2025-04-30 ENCOUNTER — PATIENT MESSAGE (OUTPATIENT)
Dept: RHEUMATOLOGY | Facility: CLINIC | Age: 35
End: 2025-04-30
Payer: COMMERCIAL

## 2025-05-01 ENCOUNTER — TELEPHONE (OUTPATIENT)
Dept: RHEUMATOLOGY | Facility: CLINIC | Age: 35
End: 2025-05-01
Payer: COMMERCIAL

## 2025-05-01 DIAGNOSIS — M05.79 RHEUMATOID ARTHRITIS INVOLVING MULTIPLE SITES WITH POSITIVE RHEUMATOID FACTOR: Primary | ICD-10-CM

## 2025-05-01 DIAGNOSIS — R76.8 POSITIVE ANA (ANTINUCLEAR ANTIBODY): ICD-10-CM

## 2025-05-02 ENCOUNTER — TELEPHONE (OUTPATIENT)
Dept: RHEUMATOLOGY | Facility: CLINIC | Age: 35
End: 2025-05-02
Payer: COMMERCIAL